# Patient Record
Sex: FEMALE | Race: BLACK OR AFRICAN AMERICAN | NOT HISPANIC OR LATINO
[De-identification: names, ages, dates, MRNs, and addresses within clinical notes are randomized per-mention and may not be internally consistent; named-entity substitution may affect disease eponyms.]

---

## 2019-11-27 PROBLEM — Z00.00 ENCOUNTER FOR PREVENTIVE HEALTH EXAMINATION: Status: ACTIVE | Noted: 2019-11-27

## 2019-12-02 ENCOUNTER — APPOINTMENT (OUTPATIENT)
Dept: BREAST CENTER | Facility: CLINIC | Age: 59
End: 2019-12-02

## 2019-12-09 ENCOUNTER — APPOINTMENT (OUTPATIENT)
Dept: BREAST CENTER | Facility: CLINIC | Age: 59
End: 2019-12-09

## 2020-01-06 ENCOUNTER — APPOINTMENT (OUTPATIENT)
Dept: BREAST CENTER | Facility: CLINIC | Age: 60
End: 2020-01-06

## 2020-01-15 ENCOUNTER — APPOINTMENT (OUTPATIENT)
Dept: BREAST CENTER | Facility: CLINIC | Age: 60
End: 2020-01-15
Payer: COMMERCIAL

## 2020-01-15 VITALS
TEMPERATURE: 98 F | HEIGHT: 62 IN | OXYGEN SATURATION: 98 % | HEART RATE: 78 BPM | DIASTOLIC BLOOD PRESSURE: 70 MMHG | WEIGHT: 175 LBS | BODY MASS INDEX: 32.2 KG/M2 | SYSTOLIC BLOOD PRESSURE: 118 MMHG

## 2020-01-15 DIAGNOSIS — R59.0 LOCALIZED ENLARGED LYMPH NODES: ICD-10-CM

## 2020-01-15 DIAGNOSIS — Z78.9 OTHER SPECIFIED HEALTH STATUS: ICD-10-CM

## 2020-01-15 DIAGNOSIS — R92.8 OTHER ABNORMAL AND INCONCLUSIVE FINDINGS ON DIAGNOSTIC IMAGING OF BREAST: ICD-10-CM

## 2020-01-15 PROCEDURE — 99203 OFFICE O/P NEW LOW 30 MIN: CPT

## 2020-01-15 RX ORDER — LORATADINE 10 MG/1
10 TABLET ORAL
Refills: 0 | Status: ACTIVE | COMMUNITY

## 2020-01-15 RX ORDER — EPINEPHRINE 0.3 MG/.3ML
0.3 INJECTION INTRAMUSCULAR
Refills: 0 | Status: ACTIVE | COMMUNITY

## 2020-01-15 RX ORDER — BACLOFEN 10 MG/1
10 TABLET ORAL
Refills: 0 | Status: ACTIVE | COMMUNITY

## 2020-01-15 RX ORDER — AMLODIPINE BESYLATE 5 MG/1
5 TABLET ORAL
Refills: 0 | Status: ACTIVE | COMMUNITY

## 2020-01-15 RX ORDER — IBUPROFEN 400 MG/1
400 TABLET, FILM COATED ORAL
Refills: 0 | Status: ACTIVE | COMMUNITY

## 2020-01-15 RX ORDER — FLUTICASONE PROPIONATE 50 MCG
SPRAY, SUSPENSION NASAL
Refills: 0 | Status: ACTIVE | COMMUNITY

## 2020-01-15 RX ORDER — TOLNAFTATE 1 G/100G
1 CREAM TOPICAL
Refills: 0 | Status: ACTIVE | COMMUNITY

## 2020-01-15 RX ORDER — RANITIDINE HCL 150 MG
150 CAPSULE ORAL
Refills: 0 | Status: ACTIVE | COMMUNITY

## 2020-01-15 RX ORDER — LANOLIN ALCOHOL/MO/W.PET/CERES
CREAM (GRAM) TOPICAL
Refills: 0 | Status: ACTIVE | COMMUNITY

## 2020-01-15 RX ORDER — CHLORPROMAZINE HYDROCHLORIDE 10 MG/1
10 TABLET, SUGAR COATED ORAL
Refills: 0 | Status: ACTIVE | COMMUNITY

## 2020-01-15 RX ORDER — AMMONIUM LACTATE 12 %
12 CREAM (GRAM) TOPICAL
Refills: 0 | Status: ACTIVE | COMMUNITY

## 2020-01-15 NOTE — PHYSICAL EXAM
[Normocephalic] : normocephalic [Atraumatic] : atraumatic [Supple] : supple [No Supraclavicular Adenopathy] : no supraclavicular adenopathy [Examined in the supine and seated position] : examined in the supine and seated position [No dominant masses] : no dominant masses in right breast  [No dominant masses] : no dominant masses left breast [No Nipple Retraction] : no left nipple retraction [No Axillary Lymphadenopathy] : no right axillary lymphadenopathy [No Nipple Discharge] : no left nipple discharge [No Rashes] : no rashes [No Edema] : no edema [No Ulceration] : no ulceration

## 2020-01-15 NOTE — HISTORY OF PRESENT ILLNESS
[FreeTextEntry1] : 60 year old post-menopausal AA female who is accompanied by her caregiver, Martina, was referred by Dr. Ray Whipple (PCP) presents for consultation regarding highly suspicious abnormal findings on mammogram completed on 11/12/19 showing left outer breast spiculated mass with abnormal appearing left axillary lymph nodes. Additional mammogram views and breast US with biopsy was recommended; however, patient has not undergone any additional follow up. NUNU of 15.1 % \par \par Discussed the next steps, which is biopsy of the left breast mass and left axillary lymph nodes. Discussed the likelihood of this lesion being cancer with the patient and the caregiver. They understood. As per caregiver, the patient's father makes medical decisions for the patient therefore will discuss the results of the biopsy with the father.\par \par To note the patient has CP and has a live-in caregiver.

## 2020-01-15 NOTE — DATA REVIEWED
[FreeTextEntry1] : --11/12/19 ([facility]) b/l screening mammo-cony showing a spiculated mass left outer breast with fine calcifications along with abnormal appearing lymph nodes in left axilla, highly suspicious for neoplastic process, additional diagnostic mammogram views, US recommended and if visualized on US, recommended for US guided biopsy of the lesion in left breast and left axillary lymph nodes recommended on day of diagnostic workup. Surgical consultation recommended. BIRADS 5 \par

## 2020-01-15 NOTE — PAST MEDICAL HISTORY
[Postmenopausal] : The patient is postmenopausal [unknown] : the patient is unsure of the date of her LMP [Total Preg ___] : G[unfilled] [Live Births ___] : P[unfilled]  [History of Hormone Replacement Treatment] : has no history of hormone replacement treatment [de-identified] : Does not remember first period [FreeTextEntry5] : None [FreeTextEntry6] : None [FreeTextEntry7] : None [FreeTextEntry8] : None

## 2020-02-04 ENCOUNTER — OUTPATIENT (OUTPATIENT)
Dept: OUTPATIENT SERVICES | Facility: HOSPITAL | Age: 60
LOS: 1 days | End: 2020-02-04
Payer: MEDICARE

## 2020-02-04 ENCOUNTER — APPOINTMENT (OUTPATIENT)
Dept: MAMMOGRAPHY | Facility: HOSPITAL | Age: 60
End: 2020-02-04
Payer: COMMERCIAL

## 2020-02-04 ENCOUNTER — RESULT REVIEW (OUTPATIENT)
Age: 60
End: 2020-02-04

## 2020-02-04 ENCOUNTER — APPOINTMENT (OUTPATIENT)
Dept: ULTRASOUND IMAGING | Facility: HOSPITAL | Age: 60
End: 2020-02-04
Payer: COMMERCIAL

## 2020-02-04 PROCEDURE — 88305 TISSUE EXAM BY PATHOLOGIST: CPT | Mod: 26

## 2020-02-04 PROCEDURE — 19084 BX BREAST ADD LESION US IMAG: CPT | Mod: 59,LT

## 2020-02-04 PROCEDURE — 76641 ULTRASOUND BREAST COMPLETE: CPT | Mod: 26,50

## 2020-02-04 PROCEDURE — 19084 BX BREAST ADD LESION US IMAG: CPT | Mod: LT

## 2020-02-04 PROCEDURE — 88360 TUMOR IMMUNOHISTOCHEM/MANUAL: CPT | Mod: 26

## 2020-02-04 PROCEDURE — 19083 BX BREAST 1ST LESION US IMAG: CPT | Mod: LT

## 2020-02-04 PROCEDURE — 38505 NEEDLE BIOPSY LYMPH NODES: CPT | Mod: LT

## 2020-02-04 PROCEDURE — 38505 NEEDLE BIOPSY LYMPH NODES: CPT

## 2020-02-04 PROCEDURE — G0279: CPT

## 2020-02-04 PROCEDURE — 88305 TISSUE EXAM BY PATHOLOGIST: CPT

## 2020-02-04 PROCEDURE — 19084 BX BREAST ADD LESION US IMAG: CPT

## 2020-02-04 PROCEDURE — A4648: CPT

## 2020-02-04 PROCEDURE — 77065 DX MAMMO INCL CAD UNI: CPT | Mod: 26,76,LT

## 2020-02-04 PROCEDURE — 19083 BX BREAST 1ST LESION US IMAG: CPT

## 2020-02-04 PROCEDURE — G0279: CPT | Mod: 26

## 2020-02-04 PROCEDURE — 76942 ECHO GUIDE FOR BIOPSY: CPT | Mod: 26

## 2020-02-04 PROCEDURE — 76942 ECHO GUIDE FOR BIOPSY: CPT

## 2020-02-04 PROCEDURE — 88360 TUMOR IMMUNOHISTOCHEM/MANUAL: CPT

## 2020-02-04 PROCEDURE — 76641 ULTRASOUND BREAST COMPLETE: CPT

## 2020-02-04 PROCEDURE — 77065 DX MAMMO INCL CAD UNI: CPT

## 2020-02-12 ENCOUNTER — APPOINTMENT (OUTPATIENT)
Dept: BREAST CENTER | Facility: CLINIC | Age: 60
End: 2020-02-12
Payer: COMMERCIAL

## 2020-02-12 VITALS
HEART RATE: 98 BPM | SYSTOLIC BLOOD PRESSURE: 116 MMHG | OXYGEN SATURATION: 96 % | HEIGHT: 62 IN | TEMPERATURE: 98.9 F | WEIGHT: 175 LBS | DIASTOLIC BLOOD PRESSURE: 78 MMHG | BODY MASS INDEX: 32.2 KG/M2

## 2020-02-12 PROCEDURE — 99213 OFFICE O/P EST LOW 20 MIN: CPT

## 2020-02-18 NOTE — PAST MEDICAL HISTORY
Duration Of Freeze Thaw-Cycle (Seconds): 5 Consent: The patient's consent was obtained including but not limited to risks of crusting, scabbing, blistering, scarring, darker or lighter pigmentary change, recurrence, incomplete removal and infection. [Postmenopausal] : The patient is postmenopausal Number Of Freeze-Thaw Cycles: 1 freeze-thaw cycle [unknown] : the patient is unsure of the date of her LMP Detail Level: Detailed [Total Preg ___] : G[unfilled] Render Post-Care Instructions In Note?: no [Live Births ___] : P[unfilled]  Post-Care Instructions: I reviewed with the patient in detail post-care instructions. Patient is to wear sunprotection, and avoid picking at any of the treated lesions. Pt may apply Vaseline to crusted or scabbing areas. [de-identified] : Does not remember first period [History of Hormone Replacement Treatment] : has no history of hormone replacement treatment Medical Necessity Clause: This procedure was medically necessary because the lesions that were treated were: [FreeTextEntry5] : None Medical Necessity Information: It is in your best interest to select a reason for this procedure from the list below. All of these items fulfill various CMS LCD requirements except the new and changing color options. [FreeTextEntry7] : None [FreeTextEntry6] : None [FreeTextEntry8] : None

## 2020-02-18 NOTE — ASSESSMENT
[FreeTextEntry1] : Emily is a 61 yo female with newly diagnosed invasive ductal carcinoma; she was accompanied today by her formal caregiver as well as her father. Education was provided regarding her diagnosis and treatment plan. She already met with Dr. Jaramillo (med onc) and the plan is for her to proceed with a PET/CT f/b neoadjuvant chemotherapy. She will RTC in 2 months to discuss further surgical management with Dr. Ya. All questions were answered; Emily, her father, and her caregiver verbalized understanding of the information.

## 2020-02-18 NOTE — REASON FOR VISIT
[Follow-Up: _____] : a [unfilled] follow-up visit [Parent] : parent [Formal Caregiver] : formal caregiver [FreeTextEntry1] : newly diagnosed left breast IDC w/ axillary mets

## 2020-02-18 NOTE — HISTORY OF PRESENT ILLNESS
[FreeTextEntry1] : Emily is a 61 yo female, who presents for follow-up after recent left breast biopsy revealing IDC, DCIS (ER+ [?95% 3+] GA negative, HER2 Equivocal, FISH negative] and left axillary biopsy revealed metastatic adenocarcinoma; KI67 is > 70%. She is here today with her father and her caregiver. \par \par The patient met with Dr. Jaramillo for consultation, the plan is for her to complete a PET/CT now with neoadjuvant chemotherapy.

## 2020-02-18 NOTE — DATA REVIEWED
[FreeTextEntry1] : --11/12/19 ([facility]) b/l screening mammo-cony showing a spiculated mass left outer breast with fine calcifications along with abnormal appearing lymph nodes in left axilla, highly suspicious for neoplastic process, additional diagnostic mammogram views, US recommended and if visualized on US, recommended for US guided biopsy of the lesion in left breast and left axillary lymph nodes recommended on day of diagnostic workup. Surgical consultation recommended. BIRADS 5 \par \par --2/4/2020 ([facility]) biopsy path: left 2:00-3:00n1-3 , 3.2 cm mass US bx, IDC, poorly differentiated, DCIS, high nuclear grade. Left 2:00-3:00n1-3, 1.9 cm mass, US bx, IDC poorly differentiated, spanning 1.7cm, DCIS high nuclear grade. Left axilla, US bx, metastatic adenocarcinoma. KI-67 is >70%; ER+ LA negative, HER2 equivocal, FISH pending.

## 2020-02-26 ENCOUNTER — OUTPATIENT (OUTPATIENT)
Dept: OUTPATIENT SERVICES | Facility: HOSPITAL | Age: 60
LOS: 1 days | End: 2020-02-26
Payer: MEDICARE

## 2020-02-26 LAB — GLUCOSE BLDC GLUCOMTR-MCNC: 99 MG/DL — SIGNIFICANT CHANGE UP (ref 70–99)

## 2020-02-26 PROCEDURE — 82962 GLUCOSE BLOOD TEST: CPT

## 2020-02-26 PROCEDURE — 78815 PET IMAGE W/CT SKULL-THIGH: CPT

## 2020-02-26 PROCEDURE — 78815 PET IMAGE W/CT SKULL-THIGH: CPT | Mod: 26

## 2020-02-26 PROCEDURE — A9552: CPT

## 2020-03-06 ENCOUNTER — OUTPATIENT (OUTPATIENT)
Dept: OUTPATIENT SERVICES | Facility: HOSPITAL | Age: 60
LOS: 1 days | End: 2020-03-06
Payer: MEDICARE

## 2020-03-06 ENCOUNTER — RESULT REVIEW (OUTPATIENT)
Age: 60
End: 2020-03-06

## 2020-03-06 ENCOUNTER — APPOINTMENT (OUTPATIENT)
Dept: CT IMAGING | Facility: HOSPITAL | Age: 60
End: 2020-03-06
Payer: COMMERCIAL

## 2020-03-06 ENCOUNTER — APPOINTMENT (OUTPATIENT)
Dept: INTERVENTIONAL RADIOLOGY/VASCULAR | Facility: HOSPITAL | Age: 60
End: 2020-03-06

## 2020-03-06 PROCEDURE — 20225 BONE BIOPSY TROCAR/NDL DEEP: CPT

## 2020-03-06 PROCEDURE — 77012 CT SCAN FOR NEEDLE BIOPSY: CPT | Mod: 26

## 2020-03-06 PROCEDURE — 88173 CYTOPATH EVAL FNA REPORT: CPT | Mod: 26

## 2020-03-06 PROCEDURE — 88305 TISSUE EXAM BY PATHOLOGIST: CPT | Mod: 26

## 2020-03-06 PROCEDURE — 88311 DECALCIFY TISSUE: CPT | Mod: 26

## 2020-03-06 PROCEDURE — 88305 TISSUE EXAM BY PATHOLOGIST: CPT

## 2020-03-06 PROCEDURE — 77012 CT SCAN FOR NEEDLE BIOPSY: CPT

## 2020-03-06 PROCEDURE — C1769: CPT

## 2020-03-06 PROCEDURE — 88173 CYTOPATH EVAL FNA REPORT: CPT

## 2020-03-06 PROCEDURE — 88311 DECALCIFY TISSUE: CPT

## 2020-03-09 LAB
NON-GYNECOLOGICAL CYTOLOGY STUDY: SIGNIFICANT CHANGE UP
SURGICAL PATHOLOGY STUDY: SIGNIFICANT CHANGE UP

## 2020-03-20 ENCOUNTER — OUTPATIENT (OUTPATIENT)
Dept: OUTPATIENT SERVICES | Facility: HOSPITAL | Age: 60
LOS: 1 days | End: 2020-03-20
Payer: MEDICAID

## 2020-03-20 ENCOUNTER — APPOINTMENT (OUTPATIENT)
Dept: INTERVENTIONAL RADIOLOGY/VASCULAR | Facility: HOSPITAL | Age: 60
End: 2020-03-20
Payer: MEDICARE

## 2020-03-20 PROCEDURE — 99152 MOD SED SAME PHYS/QHP 5/>YRS: CPT

## 2020-03-20 PROCEDURE — 76937 US GUIDE VASCULAR ACCESS: CPT

## 2020-03-20 PROCEDURE — C1788: CPT

## 2020-03-20 PROCEDURE — 77001 FLUOROGUIDE FOR VEIN DEVICE: CPT | Mod: 26

## 2020-03-20 PROCEDURE — 77001 FLUOROGUIDE FOR VEIN DEVICE: CPT

## 2020-03-20 PROCEDURE — 36561 INSERT TUNNELED CV CATH: CPT

## 2020-03-20 PROCEDURE — 76937 US GUIDE VASCULAR ACCESS: CPT | Mod: 26

## 2020-03-20 PROCEDURE — C1769: CPT

## 2020-03-23 ENCOUNTER — OUTPATIENT (OUTPATIENT)
Dept: OUTPATIENT SERVICES | Facility: HOSPITAL | Age: 60
LOS: 1 days | End: 2020-03-23
Payer: MEDICAID

## 2020-03-23 PROCEDURE — 78472 GATED HEART PLANAR SINGLE: CPT | Mod: 26

## 2020-03-23 PROCEDURE — 78472 GATED HEART PLANAR SINGLE: CPT

## 2020-03-23 PROCEDURE — A9560: CPT

## 2020-08-24 ENCOUNTER — APPOINTMENT (OUTPATIENT)
Dept: BREAST CENTER | Facility: CLINIC | Age: 60
End: 2020-08-24
Payer: MEDICARE

## 2020-08-24 VITALS — OXYGEN SATURATION: 96 % | HEART RATE: 82 BPM | BODY MASS INDEX: 32.2 KG/M2 | WEIGHT: 175 LBS | HEIGHT: 62 IN

## 2020-08-24 PROCEDURE — 99213 OFFICE O/P EST LOW 20 MIN: CPT

## 2020-08-28 ENCOUNTER — OUTPATIENT (OUTPATIENT)
Dept: OUTPATIENT SERVICES | Facility: HOSPITAL | Age: 60
LOS: 1 days | End: 2020-08-28
Payer: MEDICAID

## 2020-08-28 LAB — GLUCOSE BLDC GLUCOMTR-MCNC: 135 MG/DL — HIGH (ref 70–99)

## 2020-08-28 PROCEDURE — 78815 PET IMAGE W/CT SKULL-THIGH: CPT | Mod: 26

## 2020-08-28 PROCEDURE — A9552: CPT

## 2020-08-28 PROCEDURE — 82962 GLUCOSE BLOOD TEST: CPT

## 2020-08-28 PROCEDURE — 78815 PET IMAGE W/CT SKULL-THIGH: CPT

## 2020-08-31 ENCOUNTER — APPOINTMENT (OUTPATIENT)
Dept: ULTRASOUND IMAGING | Facility: HOSPITAL | Age: 60
End: 2020-08-31

## 2020-08-31 ENCOUNTER — APPOINTMENT (OUTPATIENT)
Dept: MAMMOGRAPHY | Facility: HOSPITAL | Age: 60
End: 2020-08-31

## 2020-08-31 NOTE — PHYSICAL EXAM
[Normocephalic] : normocephalic [Atraumatic] : atraumatic [Supple] : supple [Examined in the supine and seated position] : examined in the supine and seated position [No Supraclavicular Adenopathy] : no supraclavicular adenopathy [No dominant masses] : no dominant masses in right breast  [No Nipple Retraction] : no left nipple retraction [No Nipple Discharge] : no left nipple discharge [No Axillary Lymphadenopathy] : no left axillary lymphadenopathy [No Edema] : no edema [No Rashes] : no rashes [No Ulceration] : no ulceration

## 2020-08-31 NOTE — DATA REVIEWED
[FreeTextEntry1] : --11/12/19 ([facility]) b/l screening mammo-cony showing a spiculated mass left outer breast with fine calcifications along with abnormal appearing lymph nodes in left axilla, highly suspicious for neoplastic process, additional diagnostic mammogram views, US recommended and if visualized on US, recommended for US guided biopsy of the lesion in left breast and left axillary lymph nodes recommended on day of diagnostic workup. Surgical consultation recommended. BIRADS 5 \par  \par --2/4/2020 ([facility]) biopsy path: left 2:00-3:00n1-3 , 3.2 cm mass US bx, IDC, poorly differentiated, DCIS, high nuclear grade. Left 2:00-3:00n1-3, 1.9 cm mass, US bx, IDC poorly differentiated, spanning 1.7cm, DCIS high nuclear grade. Left axilla, US bx, metastatic adenocarcinoma. KI-67 is >70%; ER+ IA negative, HER2 equivocal, FISH negative. \par \par -- 2/26/2020 ([facility]) PET/CT: FDG uptake within left breast masses c/w known carcinoma as well as in axillary LNs c/w known carcinoma. Increased uptake in the left proximal femur, ely mets cannot be excluded. Consider tissue sampling for further evaluation. \par \par -- 3/6/2020 ([facility]) left femur bone biopsy: benign bone, no carcinoma visualized.

## 2020-08-31 NOTE — PAST MEDICAL HISTORY
[Postmenopausal] : The patient is postmenopausal [unknown] : the patient is unsure of the date of her LMP [Total Preg ___] : G[unfilled] [Live Births ___] : P[unfilled]  [History of Hormone Replacement Treatment] : has no history of hormone replacement treatment [de-identified] : Does not remember first period [FreeTextEntry5] : None [FreeTextEntry6] : None [FreeTextEntry7] : None [FreeTextEntry8] : None

## 2020-09-14 ENCOUNTER — APPOINTMENT (OUTPATIENT)
Dept: MRI IMAGING | Facility: CLINIC | Age: 60
End: 2020-09-14
Payer: MEDICARE

## 2020-09-14 ENCOUNTER — OUTPATIENT (OUTPATIENT)
Dept: OUTPATIENT SERVICES | Facility: HOSPITAL | Age: 60
LOS: 1 days | End: 2020-09-14

## 2020-09-14 PROCEDURE — 72158 MRI LUMBAR SPINE W/O & W/DYE: CPT | Mod: 26

## 2020-09-16 ENCOUNTER — APPOINTMENT (OUTPATIENT)
Dept: MRI IMAGING | Facility: CLINIC | Age: 60
End: 2020-09-16

## 2020-09-30 ENCOUNTER — APPOINTMENT (OUTPATIENT)
Dept: CT IMAGING | Facility: HOSPITAL | Age: 60
End: 2020-09-30
Payer: COMMERCIAL

## 2020-09-30 ENCOUNTER — OUTPATIENT (OUTPATIENT)
Dept: OUTPATIENT SERVICES | Facility: HOSPITAL | Age: 60
LOS: 1 days | End: 2020-09-30
Payer: MEDICAID

## 2020-09-30 ENCOUNTER — RESULT REVIEW (OUTPATIENT)
Age: 60
End: 2020-09-30

## 2020-09-30 ENCOUNTER — APPOINTMENT (OUTPATIENT)
Dept: INTERVENTIONAL RADIOLOGY/VASCULAR | Facility: HOSPITAL | Age: 60
End: 2020-09-30
Payer: COMMERCIAL

## 2020-09-30 PROCEDURE — 88305 TISSUE EXAM BY PATHOLOGIST: CPT

## 2020-09-30 PROCEDURE — 88311 DECALCIFY TISSUE: CPT | Mod: 26

## 2020-09-30 PROCEDURE — 88311 DECALCIFY TISSUE: CPT

## 2020-09-30 PROCEDURE — 88341 IMHCHEM/IMCYTCHM EA ADD ANTB: CPT

## 2020-09-30 PROCEDURE — 77012 CT SCAN FOR NEEDLE BIOPSY: CPT | Mod: 26

## 2020-09-30 PROCEDURE — 88342 IMHCHEM/IMCYTCHM 1ST ANTB: CPT | Mod: 26

## 2020-09-30 PROCEDURE — 20220 BONE BIOPSY TROCAR/NDL SUPFC: CPT

## 2020-09-30 PROCEDURE — 88305 TISSUE EXAM BY PATHOLOGIST: CPT | Mod: 26

## 2020-09-30 PROCEDURE — 88173 CYTOPATH EVAL FNA REPORT: CPT

## 2020-09-30 PROCEDURE — 88341 IMHCHEM/IMCYTCHM EA ADD ANTB: CPT | Mod: 26

## 2020-09-30 PROCEDURE — 88173 CYTOPATH EVAL FNA REPORT: CPT | Mod: 26

## 2020-09-30 PROCEDURE — 77012 CT SCAN FOR NEEDLE BIOPSY: CPT

## 2020-10-02 LAB — NON-GYNECOLOGICAL CYTOLOGY STUDY: SIGNIFICANT CHANGE UP

## 2020-10-05 LAB — SURGICAL PATHOLOGY STUDY: SIGNIFICANT CHANGE UP

## 2020-10-13 ENCOUNTER — APPOINTMENT (OUTPATIENT)
Dept: ULTRASOUND IMAGING | Facility: HOSPITAL | Age: 60
End: 2020-10-13

## 2020-10-13 ENCOUNTER — APPOINTMENT (OUTPATIENT)
Dept: MAMMOGRAPHY | Facility: HOSPITAL | Age: 60
End: 2020-10-13

## 2020-10-16 ENCOUNTER — APPOINTMENT (OUTPATIENT)
Dept: BREAST CENTER | Facility: CLINIC | Age: 60
End: 2020-10-16

## 2020-11-04 ENCOUNTER — RESULT REVIEW (OUTPATIENT)
Age: 60
End: 2020-11-04

## 2020-11-04 ENCOUNTER — OUTPATIENT (OUTPATIENT)
Dept: OUTPATIENT SERVICES | Facility: HOSPITAL | Age: 60
LOS: 1 days | End: 2020-11-04
Payer: MEDICAID

## 2020-11-04 ENCOUNTER — APPOINTMENT (OUTPATIENT)
Dept: ULTRASOUND IMAGING | Facility: HOSPITAL | Age: 60
End: 2020-11-04

## 2020-11-04 ENCOUNTER — APPOINTMENT (OUTPATIENT)
Dept: MAMMOGRAPHY | Facility: HOSPITAL | Age: 60
End: 2020-11-04

## 2020-11-04 DIAGNOSIS — Z01.818 ENCOUNTER FOR OTHER PREPROCEDURAL EXAMINATION: ICD-10-CM

## 2020-11-04 PROCEDURE — 77065 DX MAMMO INCL CAD UNI: CPT

## 2020-11-04 PROCEDURE — 76641 ULTRASOUND BREAST COMPLETE: CPT

## 2020-11-04 PROCEDURE — 19285 PERQ DEV BREAST 1ST US IMAG: CPT | Mod: LT

## 2020-11-04 PROCEDURE — G0279: CPT

## 2020-11-04 PROCEDURE — 19286 PERQ DEV BREAST ADD US IMAG: CPT | Mod: LT

## 2020-11-04 PROCEDURE — C1739: CPT

## 2020-11-04 PROCEDURE — 19285 PERQ DEV BREAST 1ST US IMAG: CPT

## 2020-11-04 PROCEDURE — 19281 PERQ DEVICE BREAST 1ST IMAG: CPT | Mod: 59,LT

## 2020-11-04 PROCEDURE — G0279: CPT | Mod: 26

## 2020-11-04 PROCEDURE — 19286 PERQ DEV BREAST ADD US IMAG: CPT

## 2020-11-04 PROCEDURE — 77065 DX MAMMO INCL CAD UNI: CPT | Mod: 26,59,LT

## 2020-11-04 PROCEDURE — 76641 ULTRASOUND BREAST COMPLETE: CPT | Mod: 26,LT

## 2020-11-04 PROCEDURE — 19281 PERQ DEVICE BREAST 1ST IMAG: CPT

## 2020-11-05 ENCOUNTER — OUTPATIENT (OUTPATIENT)
Dept: OUTPATIENT SERVICES | Facility: HOSPITAL | Age: 60
LOS: 1 days | End: 2020-11-05
Payer: MEDICAID

## 2020-11-05 ENCOUNTER — TRANSCRIPTION ENCOUNTER (OUTPATIENT)
Age: 60
End: 2020-11-05

## 2020-11-05 ENCOUNTER — RESULT REVIEW (OUTPATIENT)
Age: 60
End: 2020-11-05

## 2020-11-05 PROCEDURE — 78195 LYMPH SYSTEM IMAGING: CPT | Mod: 26

## 2020-11-05 NOTE — ASU PATIENT PROFILE, ADULT - PMH
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

## 2020-11-05 NOTE — ASU PATIENT PROFILE, ADULT - VISION (WITH CORRECTIVE LENSES IF THE PATIENT USUALLY WEARS THEM):
Severely impaired: cannot locate objects without hearing or touching them or patient nonresponsive. Partially impaired: cannot see medication labels or newsprint, but can see obstacles in path, and the surrounding layout; can count fingers at arm's length

## 2020-11-06 ENCOUNTER — OUTPATIENT (OUTPATIENT)
Dept: OUTPATIENT SERVICES | Facility: HOSPITAL | Age: 60
LOS: 1 days | Discharge: HOME | End: 2020-11-06
Payer: MEDICAID

## 2020-11-06 ENCOUNTER — APPOINTMENT (OUTPATIENT)
Dept: BREAST CENTER | Facility: CLINIC | Age: 60
End: 2020-11-06

## 2020-11-06 ENCOUNTER — RESULT REVIEW (OUTPATIENT)
Age: 60
End: 2020-11-06

## 2020-11-06 VITALS
RESPIRATION RATE: 16 BRPM | HEART RATE: 89 BPM | DIASTOLIC BLOOD PRESSURE: 104 MMHG | HEIGHT: 62 IN | OXYGEN SATURATION: 99 % | WEIGHT: 156.09 LBS | TEMPERATURE: 97 F | SYSTOLIC BLOOD PRESSURE: 161 MMHG

## 2020-11-06 PROCEDURE — 76098 X-RAY EXAM SURGICAL SPECIMEN: CPT | Mod: 26

## 2020-11-06 PROCEDURE — 88307 TISSUE EXAM BY PATHOLOGIST: CPT | Mod: 26

## 2020-11-06 PROCEDURE — 88309 TISSUE EXAM BY PATHOLOGIST: CPT | Mod: 26

## 2020-11-06 PROCEDURE — 88342 IMHCHEM/IMCYTCHM 1ST ANTB: CPT | Mod: 26

## 2020-11-06 PROCEDURE — 88332 PATH CONSLTJ SURG EA ADD BLK: CPT | Mod: 26

## 2020-11-06 PROCEDURE — 88305 TISSUE EXAM BY PATHOLOGIST: CPT | Mod: 26

## 2020-11-06 PROCEDURE — A9541: CPT

## 2020-11-06 PROCEDURE — 88341 IMHCHEM/IMCYTCHM EA ADD ANTB: CPT | Mod: 26

## 2020-11-06 PROCEDURE — 19307 MAST MOD RAD: CPT | Mod: LT

## 2020-11-06 PROCEDURE — 88331 PATH CONSLTJ SURG 1 BLK 1SPC: CPT | Mod: 26

## 2020-11-06 PROCEDURE — 78195 LYMPH SYSTEM IMAGING: CPT

## 2020-11-06 RX ORDER — BUPIVACAINE 13.3 MG/ML
20 INJECTION, SUSPENSION, LIPOSOMAL INFILTRATION ONCE
Refills: 0 | Status: DISCONTINUED | OUTPATIENT
Start: 2020-11-06 | End: 2020-11-07

## 2020-11-06 RX ORDER — AMLODIPINE BESYLATE 2.5 MG/1
5 TABLET ORAL DAILY
Refills: 0 | Status: DISCONTINUED | OUTPATIENT
Start: 2020-11-06 | End: 2020-11-07

## 2020-11-06 RX ORDER — ACETAMINOPHEN 500 MG
1000 TABLET ORAL EVERY 6 HOURS
Refills: 0 | Status: DISCONTINUED | OUTPATIENT
Start: 2020-11-06 | End: 2020-11-07

## 2020-11-06 RX ORDER — KETOROLAC TROMETHAMINE 30 MG/ML
15 SYRINGE (ML) INJECTION EVERY 6 HOURS
Refills: 0 | Status: DISCONTINUED | OUTPATIENT
Start: 2020-11-06 | End: 2020-11-07

## 2020-11-06 RX ORDER — AMLODIPINE BESYLATE 2.5 MG/1
1 TABLET ORAL
Qty: 0 | Refills: 0 | DISCHARGE

## 2020-11-06 RX ORDER — DIPHENHYDRAMINE HCL 50 MG
0 CAPSULE ORAL
Qty: 0 | Refills: 0 | DISCHARGE

## 2020-11-06 RX ORDER — OXYCODONE HYDROCHLORIDE 5 MG/1
5 TABLET ORAL EVERY 6 HOURS
Refills: 0 | Status: DISCONTINUED | OUTPATIENT
Start: 2020-11-06 | End: 2020-11-07

## 2020-11-06 RX ORDER — SODIUM CHLORIDE 9 MG/ML
1000 INJECTION, SOLUTION INTRAVENOUS
Refills: 0 | Status: DISCONTINUED | OUTPATIENT
Start: 2020-11-06 | End: 2020-11-07

## 2020-11-06 RX ORDER — CHLORPROMAZINE HCL 10 MG
0 TABLET ORAL
Qty: 0 | Refills: 0 | DISCHARGE

## 2020-11-06 RX ORDER — MICONAZOLE NITRATE 2 %
0 CREAM (GRAM) TOPICAL
Qty: 0 | Refills: 0 | DISCHARGE

## 2020-11-06 RX ORDER — ONDANSETRON 8 MG/1
4 TABLET, FILM COATED ORAL ONCE
Refills: 0 | Status: DISCONTINUED | OUTPATIENT
Start: 2020-11-06 | End: 2020-11-07

## 2020-11-06 RX ORDER — FLUTICASONE PROPIONATE 50 MCG
0 SPRAY, SUSPENSION NASAL
Qty: 0 | Refills: 0 | DISCHARGE

## 2020-11-06 RX ORDER — OXYCODONE HYDROCHLORIDE 5 MG/1
10 TABLET ORAL EVERY 6 HOURS
Refills: 0 | Status: DISCONTINUED | OUTPATIENT
Start: 2020-11-06 | End: 2020-11-07

## 2020-11-06 RX ADMIN — Medication 1000 MILLIGRAM(S): at 22:57

## 2020-11-06 RX ADMIN — Medication 15 MILLIGRAM(S): at 19:18

## 2020-11-06 RX ADMIN — Medication 1000 MILLIGRAM(S): at 23:14

## 2020-11-06 NOTE — BRIEF OPERATIVE NOTE - OPERATION/FINDINGS
LEFT SLNB performed through axillary incision. Two nodes localized w/ Ruthie  clips retrieved. Tc99 tracer did not map. Multiple blue lymphatics noted however tagged nodes not blue. Frozen section: SLN replaced w/ carcinoma. Palpable LNs in level 1 & 2 removed.     Flaps raised superiorly to clavicle, medially to sternum, inferiorly to IMF, laterally to border of latissimus dorsi, and posteriorly to pec major fascia using electrocautery. PHUONG drains placed in breast & axilla. Skin closed using 3-0 Vicryl & 5-0 Monocryl. Exparel injected.

## 2020-11-06 NOTE — PROGRESS NOTE ADULT - SUBJECTIVE AND OBJECTIVE BOX
POST-OPERATIVE NOTE    Procedure: Modified left radical mastectomy with SNLB and SLND.     Diagnosis/Indication: Left breast invasive ductal carcinoma    Surgeon: Melody Ya    S: Seen and evaluated at bedside. Resting comfortably in bed. Denies any pain in her chest or weakness in her upper extremities. Tolerating apple juice without nausea or emesis. Has not passed flatus or had BM since her procedure. Denies CP, SOB, JEREZ, calf tenderness. Pain controlled with medication.    O:  T(C): 36.9 (11-06-20 @ 21:46), Max: 36.9 (11-06-20 @ 21:46)  T(F): 98.5 (11-06-20 @ 21:46), Max: 98.5 (11-06-20 @ 21:46)  HR: 68 (11-06-20 @ 21:46) (68 - 79)  BP: 135/83 (11-06-20 @ 21:46) (105/69 - 135/83)  RR: 18 (11-06-20 @ 21:46) (18 - 19)  SpO2: 99% (11-06-20 @ 21:46) (99% - 99%)  Wt(kg): --    I&O's Summary    06 Nov 2020 07:01  -  06 Nov 2020 23:13  --------------------------------------------------------  IN: 200 mL / OUT: 400 mL / NET: -200 mL      Gen: NAD, resting comfortably in bed  C/V: NSR  Pulm: Nonlabored breathing, no respiratory distress  Chest: Left breast soft, without surrounding ecchymosis or hematoma formation. Left breast and left axillary drains in place to bulb suction  Abd: soft, NT/ND  Extrem: WWP, no calf edema or tenderness, SCDs in place  Incision: c/d/i with steri strips in place      A/P: 60y Female s/p above procedure    Diet: Regular   IVF: LR 100cc/hr  Analgesia with tylenol, toradol, and oxycodone  Anatiemetics PRN-zofran  SCDs/OOBA/IS- Holding chemical PPX  Will need PHUONG drain education

## 2020-11-07 VITALS
OXYGEN SATURATION: 99 % | SYSTOLIC BLOOD PRESSURE: 159 MMHG | DIASTOLIC BLOOD PRESSURE: 85 MMHG | HEART RATE: 93 BPM | TEMPERATURE: 98 F | RESPIRATION RATE: 18 BRPM

## 2020-11-07 PROCEDURE — 19301 PARTIAL MASTECTOMY: CPT | Mod: LT

## 2020-11-07 PROCEDURE — 88305 TISSUE EXAM BY PATHOLOGIST: CPT

## 2020-11-07 PROCEDURE — 88307 TISSUE EXAM BY PATHOLOGIST: CPT

## 2020-11-07 PROCEDURE — 88341 IMHCHEM/IMCYTCHM EA ADD ANTB: CPT

## 2020-11-07 PROCEDURE — 88331 PATH CONSLTJ SURG 1 BLK 1SPC: CPT

## 2020-11-07 PROCEDURE — 76098 X-RAY EXAM SURGICAL SPECIMEN: CPT

## 2020-11-07 PROCEDURE — 38525 BIOPSY/REMOVAL LYMPH NODES: CPT | Mod: LT

## 2020-11-07 PROCEDURE — 88332 PATH CONSLTJ SURG EA ADD BLK: CPT

## 2020-11-07 PROCEDURE — 88309 TISSUE EXAM BY PATHOLOGIST: CPT

## 2020-11-07 RX ADMIN — Medication 15 MILLIGRAM(S): at 11:27

## 2020-11-07 RX ADMIN — Medication 15 MILLIGRAM(S): at 06:30

## 2020-11-07 RX ADMIN — Medication 1000 MILLIGRAM(S): at 11:26

## 2020-11-07 RX ADMIN — Medication 15 MILLIGRAM(S): at 05:58

## 2020-11-07 RX ADMIN — Medication 1000 MILLIGRAM(S): at 06:25

## 2020-11-07 RX ADMIN — AMLODIPINE BESYLATE 5 MILLIGRAM(S): 2.5 TABLET ORAL at 06:00

## 2020-11-07 RX ADMIN — Medication 1000 MILLIGRAM(S): at 06:01

## 2020-11-07 RX ADMIN — Medication 1000 MILLIGRAM(S): at 12:20

## 2020-11-07 RX ADMIN — Medication 15 MILLIGRAM(S): at 12:20

## 2020-11-07 NOTE — PROGRESS NOTE ADULT - ASSESSMENT
60F w/ CP & L breast IDC w/ axillary mets s/p NAC now POD 1 s/p L MRM (11/6).     - 23hr observation   - discharge

## 2020-11-07 NOTE — PROGRESS NOTE ADULT - SUBJECTIVE AND OBJECTIVE BOX
POST-OP DAY: 1 s/p L MRM     SUBJECTIVE: Patient seen and examined bedside by chief resident. Denies pain, tolerating regular diet, denies N/V. Pain well managed. Clear for d/c.    amLODIPine   Tablet 5 milliGRAM(s) Oral daily    MEDICATIONS  (PRN):  ondansetron Injectable 4 milliGRAM(s) IV Push Once PRN Nausea  oxyCODONE    IR 5 milliGRAM(s) Oral every 6 hours PRN Moderate Pain (4 - 6)  oxyCODONE    IR 10 milliGRAM(s) Oral every 6 hours PRN Severe Pain (7 - 10)      I&O's Detail    06 Nov 2020 07:01  -  07 Nov 2020 07:00  --------------------------------------------------------  IN:    Lactated Ringers: 1100 mL  Total IN: 1100 mL    OUT:    Bulb (mL): 57 mL    Bulb (mL): 40 mL    Voided (mL): 350 mL  Total OUT: 447 mL    Total NET: 653 mL    Vital Signs Last 24 Hrs  T(C): 36.6 (07 Nov 2020 09:00), Max: 36.9 (06 Nov 2020 21:46)  T(F): 97.9 (07 Nov 2020 09:00), Max: 98.5 (06 Nov 2020 21:46)  HR: 82 (07 Nov 2020 09:00) (68 - 82)  BP: 154/79 (07 Nov 2020 09:00) (98/58 - 154/79)  BP(mean): 83 (06 Nov 2020 19:20) (74 - 83)  RR: 15 (07 Nov 2020 09:00) (12 - 19)  SpO2: 100% (07 Nov 2020 09:00) (99% - 100%)    General: NAD, resting comfortably in bed  C/V: NSR  Breast: Dressing clean, dry, intact, PHUONG x2 SS.   Pulm: Nonlabored breathing, no respiratory distress  Abd: soft, NT/ND  Extrem: WWP, no edema, SCDs in place    LABS:                RADIOLOGY & ADDITIONAL STUDIES:

## 2020-11-11 ENCOUNTER — APPOINTMENT (OUTPATIENT)
Dept: BREAST CENTER | Facility: CLINIC | Age: 60
End: 2020-11-11
Payer: MEDICARE

## 2020-11-11 VITALS — HEIGHT: 62 IN | OXYGEN SATURATION: 99 % | HEART RATE: 93 BPM | BODY MASS INDEX: 32.2 KG/M2 | WEIGHT: 175 LBS

## 2020-11-11 PROBLEM — Z86.79 PERSONAL HISTORY OF OTHER DISEASES OF THE CIRCULATORY SYSTEM: Chronic | Status: ACTIVE | Noted: 2020-11-05

## 2020-11-11 PROBLEM — G80.2 SPASTIC HEMIPLEGIC CEREBRAL PALSY: Chronic | Status: ACTIVE | Noted: 2020-11-05

## 2020-11-11 PROBLEM — R06.6 HICCOUGH: Chronic | Status: ACTIVE | Noted: 2020-11-05

## 2020-11-11 PROBLEM — C50.912 MALIGNANT NEOPLASM OF UNSPECIFIED SITE OF LEFT FEMALE BREAST: Chronic | Status: ACTIVE | Noted: 2020-11-05

## 2020-11-11 PROCEDURE — 99024 POSTOP FOLLOW-UP VISIT: CPT

## 2020-11-12 NOTE — DATA REVIEWED
[FreeTextEntry1] : --11/12/19 ([facility]) b/l screening mammo-cony showing a spiculated mass left outer breast with fine calcifications along with abnormal appearing lymph nodes in left axilla, highly suspicious for neoplastic process, additional diagnostic mammogram views, US recommended and if visualized on US, recommended for US guided biopsy of the lesion in left breast and left axillary lymph nodes recommended on day of diagnostic workup. Surgical consultation recommended. BI-RADS 5 \par  \par --2/4/2020 ([facility]) biopsy path: left 2:00-3:00n1-3 , 3.2 cm mass US bx, IDC, poorly differentiated, DCIS, high nuclear grade. Left 2:00-3:00n1-3, 1.9 cm mass, US bx, IDC poorly differentiated, spanning 1.7cm, DCIS high nuclear grade. Left axilla, US bx, metastatic adenocarcinoma. KI-67 is >70%; ER+ NC negative, HER2 equivocal, FISH negative. \par \par -- 2/26/2020 ([facility]) PET/CT: FDG uptake within left breast masses c/w known carcinoma as well as in axillary LNs c/w known carcinoma. Increased uptake in the left proximal femur, ely mets cannot be excluded. Consider tissue sampling for further evaluation. \par \par -- 3/6/2020 ([facility]) left femur bone biopsy: benign bone, no carcinoma visualized. \par \par --11/9/2020 ([facility]) left mastectomy, ALND surgical pathology pending

## 2020-11-12 NOTE — HISTORY OF PRESENT ILLNESS
[FreeTextEntry1] : Patient presents for post op for left breast IDC & DCIS (ER+ [95% 3+] [place] negative, HER2 Equivocal, FISH negative] and metastatic adenocarcinoma to the left axilla; KI67 is > 70%. S/p neoadjuvant chemotherapy. S/p left mastectomy with ALND on 11/6/2020. Final surgical pathology is pending. PHUONG drains in place with serosanguineous fluid, 50-60mL in past 24 hourst. She denies fever or chills. Reports some tenderness at surgical site, denies pain. \par \par Discussed with patient and aide (and her father on POD1) that final pathology is pending, however, frozen section revealed cancer in the clipped nodes. Discussed that the patient will need post mastectomy radiation for this reason and because of the aggressiveness of the tumor. \par \par \par

## 2020-11-12 NOTE — REASON FOR VISIT
[Post Op: _________] : a [unfilled] post op visit [FreeTextEntry1] : left breast IDC w/ axillary mets, s/p left mastectomy and left ALND

## 2020-11-12 NOTE — PAST MEDICAL HISTORY
[Postmenopausal] : The patient is postmenopausal [unknown] : the patient is unsure of the date of her LMP [Total Preg ___] : G[unfilled] [Live Births ___] : P[unfilled]  [History of Hormone Replacement Treatment] : has no history of hormone replacement treatment [de-identified] : Does not remember first period [FreeTextEntry5] : None [FreeTextEntry6] : None [FreeTextEntry7] : None [FreeTextEntry8] : None

## 2020-11-12 NOTE — PHYSICAL EXAM
[Normocephalic] : normocephalic [Atraumatic] : atraumatic [Supple] : supple [No Supraclavicular Adenopathy] : no supraclavicular adenopathy [No Cervical Adenopathy] : no cervical adenopathy [Examined in the supine and seated position] : examined in the supine and seated position [No dominant masses] : no dominant masses in right breast  [No dominant masses] : no dominant masses left breast [No Nipple Retraction] : no right nipple retraction [No Nipple Discharge] : no right nipple discharge [No Axillary Lymphadenopathy] : no left axillary lymphadenopathy [No Edema] : no edema [No Rashes] : no rashes [No Ulceration] : no ulceration

## 2020-11-16 ENCOUNTER — APPOINTMENT (OUTPATIENT)
Dept: BREAST CENTER | Facility: CLINIC | Age: 60
End: 2020-11-16
Payer: MEDICARE

## 2020-11-16 VITALS — WEIGHT: 175 LBS | HEIGHT: 62 IN | HEART RATE: 103 BPM | OXYGEN SATURATION: 98 % | BODY MASS INDEX: 32.2 KG/M2

## 2020-11-16 LAB — SURGICAL PATHOLOGY STUDY: SIGNIFICANT CHANGE UP

## 2020-11-16 PROCEDURE — 99024 POSTOP FOLLOW-UP VISIT: CPT

## 2020-11-16 NOTE — DATA REVIEWED
[FreeTextEntry1] : --11/12/19 ([facility]) b/l screening mammo-cony showing a spiculated mass left outer breast with fine calcifications along with abnormal appearing lymph nodes in left axilla, highly suspicious for neoplastic process, additional diagnostic mammogram views, US recommended and if visualized on US, recommended for US guided biopsy of the lesion in left breast and left axillary lymph nodes recommended on day of diagnostic workup. Surgical consultation recommended. BI-RADS 5 \par  \par --2/4/2020 ([facility]) biopsy path: left 2:00-3:00n1-3 , 3.2 cm mass US bx, IDC, poorly differentiated, DCIS, high nuclear grade. Left 2:00-3:00n1-3, 1.9 cm mass, US bx, IDC poorly differentiated, spanning 1.7cm, DCIS high nuclear grade. Left axilla, US bx, metastatic adenocarcinoma. KI-67 is >70%; ER+ IA negative, HER2 equivocal, FISH negative. \par \par -- 2/26/2020 ([facility]) PET/CT: FDG uptake within left breast masses c/w known carcinoma as well as in axillary LNs c/w known carcinoma. Increased uptake in the left proximal femur, ely mets cannot be excluded. Consider tissue sampling for further evaluation. \par \par -- 3/6/2020 ([facility]) left femur bone biopsy: benign bone, no carcinoma visualized. \par \par --11/9/2020 ([facility]) left mastectomy, ALND surgical pathology pending

## 2020-11-16 NOTE — PAST MEDICAL HISTORY
[Postmenopausal] : The patient is postmenopausal [unknown] : the patient is unsure of the date of her LMP [Total Preg ___] : G[unfilled] [Live Births ___] : P[unfilled]  [History of Hormone Replacement Treatment] : has no history of hormone replacement treatment [de-identified] : Does not remember first period [FreeTextEntry5] : None [FreeTextEntry6] : None [FreeTextEntry7] : None [FreeTextEntry8] : None

## 2020-11-20 ENCOUNTER — APPOINTMENT (OUTPATIENT)
Dept: BREAST CENTER | Facility: CLINIC | Age: 60
End: 2020-11-20
Payer: MEDICARE

## 2020-11-20 VITALS — OXYGEN SATURATION: 97 % | BODY MASS INDEX: 32.2 KG/M2 | WEIGHT: 175 LBS | HEIGHT: 62 IN | HEART RATE: 110 BPM

## 2020-11-20 PROCEDURE — 99024 POSTOP FOLLOW-UP VISIT: CPT

## 2020-11-24 PROBLEM — G80.9 CEREBRAL PALSY: Status: ACTIVE | Noted: 2020-11-24

## 2020-11-24 NOTE — DATA REVIEWED
[FreeTextEntry1] : --11/12/19 ([facility]) b/l screening mammo-cony showing a spiculated mass left outer breast with fine calcifications along with abnormal appearing lymph nodes in left axilla, highly suspicious for neoplastic process, additional diagnostic mammogram views, US recommended and if visualized on US, recommended for US guided biopsy of the lesion in left breast and left axillary lymph nodes recommended on day of diagnostic workup. Surgical consultation recommended. BI-RADS 5 \par  \par --2/4/2020 ([facility]) biopsy path: left 2:00-3:00n1-3 , 3.2 cm mass US bx, IDC, poorly differentiated, DCIS, high nuclear grade. Left 2:00-3:00n1-3, 1.9 cm mass, US bx, IDC poorly differentiated, spanning 1.7cm, DCIS high nuclear grade. Left axilla, US bx, metastatic adenocarcinoma. KI-67 is >70%; ER+ UT negative, HER2 equivocal, FISH negative. \par \par -- 2/26/2020 ([facility]) PET/CT: FDG uptake within left breast masses c/w known carcinoma as well as in axillary LNs c/w known carcinoma. Increased uptake in the left proximal femur, ely mets cannot be excluded. Consider tissue sampling for further evaluation. \par \par -- 3/6/2020 ([facility]) left femur bone biopsy: benign bone, no carcinoma visualized. \par \par --11/9/2020 ([facility]) left mastectomy, ALND surgical pathology: two sentinel lymph nodes positive for metastatic disease with extranodal extension, left axillary lymph node excision: one lymph node negative for tumor, left mastectomy: invasive ductal carcinoma, G3, 7mm, 5mm, and 2mm in greatest dimension, less than 1mm from posterior margin, DCIS high nuclear grade, solid and cribriform types, associated with central necrosis, lymph-vascular invasion identified, previous procedure site related change, left axillary content dissection one of two lymph nodes positive for metastatic carcinoma and extranodal extension, left palpable axillary lymph node excision: one of two lymph nodes positive for metastatic carcinoma and extranodal extension. Pathologic staging: mypT1b pN2a

## 2020-11-24 NOTE — REASON FOR VISIT
[Post Op: _________] : a [unfilled] post op visit [FreeTextEntry1] : left breast IDC, DCIS w/ axillary mets, s/p left mastectomy and left ALND

## 2020-11-24 NOTE — PAST MEDICAL HISTORY
[Postmenopausal] : The patient is postmenopausal [unknown] : the patient is unsure of the date of her LMP [Total Preg ___] : G[unfilled] [Live Births ___] : P[unfilled]  [History of Hormone Replacement Treatment] : has no history of hormone replacement treatment [de-identified] : Does not remember first period [FreeTextEntry5] : None [FreeTextEntry6] : None [FreeTextEntry7] : None [FreeTextEntry8] : None

## 2020-11-24 NOTE — HISTORY OF PRESENT ILLNESS
[FreeTextEntry1] : Emily is a 60 year old female who presents for post op evaluation for multifocal left breast IDC, DCIS at 5:00, 7mm, and 11:00, 5mm and 2mm , ER+ (95%), PA negative, HER2 Equivocal, FISH negative, with metastatic adenocarcinoma to the left axilla; KI67 is > 70%. S/p neoadjuvant chemotherapy. S/p left mastectomy with ALND on 11/6/2020, 4/7 lymph nodes were positive for metastatic disease, lymphovascular invasion present. PHUONG drains in place with serosanguineous fluid, approximately <30mL in past 24 hours. She denies fever or chills.  \par \par Will remove PHUONG drains today. Reviewed pathology with patient and caregiver. They both understand that the next step is radiation oncology consult for post mastectomy radiation. \par \par \par

## 2020-11-25 ENCOUNTER — APPOINTMENT (OUTPATIENT)
Dept: RADIATION ONCOLOGY | Facility: CLINIC | Age: 60
End: 2020-11-25

## 2020-11-25 DIAGNOSIS — G80.9 CEREBRAL PALSY, UNSPECIFIED: ICD-10-CM

## 2020-12-04 ENCOUNTER — APPOINTMENT (OUTPATIENT)
Dept: RADIATION ONCOLOGY | Facility: CLINIC | Age: 60
End: 2020-12-04
Payer: MEDICARE

## 2020-12-04 VITALS
BODY MASS INDEX: 32.01 KG/M2 | HEART RATE: 90 BPM | WEIGHT: 175 LBS | DIASTOLIC BLOOD PRESSURE: 73 MMHG | SYSTOLIC BLOOD PRESSURE: 128 MMHG | OXYGEN SATURATION: 100 % | RESPIRATION RATE: 16 BRPM

## 2020-12-04 DIAGNOSIS — Z92.21 PERSONAL HISTORY OF ANTINEOPLASTIC CHEMOTHERAPY: ICD-10-CM

## 2020-12-04 DIAGNOSIS — Z78.9 OTHER SPECIFIED HEALTH STATUS: ICD-10-CM

## 2020-12-04 PROCEDURE — 99072 ADDL SUPL MATRL&STAF TM PHE: CPT

## 2020-12-04 PROCEDURE — 99202 OFFICE O/P NEW SF 15 MIN: CPT

## 2020-12-04 NOTE — REVIEW OF SYSTEMS
[Patient Intake Form Reviewed] : Patient intake form was reviewed [Muscle Weakness] : muscle weakness [Gait Disturbance] : gait disturbance [Difficulty Walking] : difficulty walking [Negative] : Heme/Lymph [FreeTextEntry8] : +urinary incontinence. [de-identified] : s/p left mastectomy.

## 2020-12-04 NOTE — PHYSICAL EXAM
[Normal] : normal spine exam without palpable tenderness, no kyphosis or scoliosis [Oriented To Time, Place, And Person] : oriented to person, place, and time [Affect] : the affect was normal [de-identified] : She has stigmata of cerebral palsy [de-identified] : s/p left mastectomy. Well healing incisions to left CW and axilla. Well healing old PHUONG drain sites.  Right breast negative.   [de-identified] : difficulty ambulating, uses walker and is able to ambulate with assistance. RIght hemiplegia. [de-identified] : Well healing incisions to left CW and axilla. Well healing old PHUONG drain sites. [de-identified] : Right hemiplegia.

## 2020-12-04 NOTE — DISEASE MANAGEMENT
[FreeTextEntry4] : mypT1b pN2a [TTNM] : (m2)yp1b [NTNM] : yp2a [MTNM] : y0 [IIIB] : IIIB [de-identified] : Left chest wall

## 2021-01-13 ENCOUNTER — NON-APPOINTMENT (OUTPATIENT)
Age: 61
End: 2021-01-13

## 2021-01-15 NOTE — PHYSICAL EXAM
[Normal] : normal spine exam without palpable tenderness, no kyphosis or scoliosis [Oriented To Time, Place, And Person] : oriented to person, place, and time [Affect] : the affect was normal [Sclera] : the sclera and conjunctiva were normal [Outer Ear] : the ears and nose were normal in appearance [Hearing Threshold Finger Rub Not [place]] : hearing was normal [] : no respiratory distress [Respiration, Rhythm And Depth] : normal respiratory rhythm and effort [de-identified] : She has stigmata of cerebral palsy [de-identified] : s/p left mastectomy. Well healed incisions to left CW and axilla. Well healing old PHUONG drain sites.  Right breast negative.   [de-identified] : difficulty ambulating, uses walker and is able to ambulate with assistance. RIght hemiplegia. [de-identified] : Right hemiplegia.  [de-identified] : Well healed incisions to left CW and axilla. Well healing old PHUONG drain sites.

## 2021-01-15 NOTE — DISEASE MANAGEMENT
[Pathological] : TNM Stage: p [IIIB] : IIIB [FreeTextEntry4] : mypT1b pN2a [TTNM] : (m2)yp1b [NTNM] : yp2a [MTNM] : y0 [de-identified] : 5000 cGy [de-identified] : 600 cGy [de-identified] : Left chest wall and nodes

## 2021-01-15 NOTE — HISTORY OF PRESENT ILLNESS
[FreeTextEntry1] : 1/13/21- OTV- Ms. Nayak has completed 600 cGy/ 5000 cGy to the left CW and nodes.  Today, she reports she feels good and treatment is going well thus far. She has no complaints to include breast pain, swelling, dysphagia, or skin irritation.\par \par _____________________\par INITIAL EVAL (12/4/2020)\par Ms. Emily Nayak is a 60 year old post-menopausal female referred by Dr. 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 She is s/p neoadjuvant AC-T chemotherapy under the management of Dr. Jaramillo and s/p left mastectomy and left axillary lymph node dissection on 11/6/2020, mypT1b pN2a. \par \par Bilateral screening mammo done 11/12/19 ([facility]) showed the following: \par -A spiculated mass within the left outer breast with fine calcifications along with abnormal appearing lymph nodes in the left axilla, these findings are highly suspicious for neoplastic process, additional diagnostic mammographic views, ultrasound examination and if these findings are visualized on ultrasound then ultrasound-guided biopsy of the lesion in the left breast and left axillary lymph nodes is recommended on the day of diagnostic workup.\par BI-RADS 5. \par \par She established care with Dr. Ya on 1/15/2020, at which time was noted a palpable left breast mass on exam. On 2/4/2020, she underwent US guided core biopsy with microclip placements. Pathology revealed the following: \par 1. Breast, left, 2:00-3:00, 1-3 cmFN, 3.2 cm mass: Invasive ductal carcinoma, poorly-differentiated, spanning 1.2 cm in this material.\par Ductal carcinoma in situ (DCIS), solid type with high nuclear grade.\par 2. Breast, left, 2:00-3:00, 1-3 cm FN, 1.9 cm mass: Invasive ductal carcinoma, poorly-differentiated, spanning 1.7 cm in this material, morphologically similar to part 1. Ductal carcinoma in situ (DCIS), solid type with high nuclear grade.\par 3. Axilla, left, abnormal lymph node: Metastatic adenocarcinoma, spanning 5.5 mm in this material, morphologically similar to part 1.\par -ER positive (> 95% nuclear staining with strong 3+ intensity), CA negative, HER-2 equivocal, negative via FISH. \par -Ki-67 proliferation index is over 70%.\par \par PET/ CT done 2/26/2020 showed the following: \par -FDG uptake within  ill-defined left breast masses which are consistent with patient's known carcinoma.\par -Nonenlarged FDG avid axillary lymph nodes consistent with known janel metastasis. \par -Increased FDG avidity in the left proximal femur with a 4 cm lucent lesion without cortical disruption, bony metastasis cannot be excluded.  \par \par She had a biopsy of the left femur lesion on 3/6/2020; pathology was benign. \par \par She received neoadjuvant ACT chemotherapy under the management of Dr. Jaramillo from 3/26/2020 to 8/13/2020. \par \par PET/ CT done 8/28/2020 revealed the following: \par -Since 2/26/20, there is no longer abnormal activity in the left breast and left axilla.\par -There is a 0.8 cm sclerotic lesion in the L2 vertebral body, which is more dense and larger than on prior study. Although it is not hypermetabolic, it is still suspicious for metastatic disease. Recommend further evaluation with MRI and/or biopsy.\par -No change in hypermetabolic lucent lesion left femur. Recommend correlation with biopsy results.\par \par MRI Lumbar Spine done 9/14/2020 showed the following: \par -8 mm well-marginated lesion seen within right posterior aspect of L2 lumbar vertebral body demonstrating predominant hypointensity on all pulse sequences and no significant enhancement. This corresponds to a sclerotic lesion seen on CT scan of 8/28/2020. Differential remains a metastasis (osteoblastic or sclerotic) versus other etiologies such as intraosseous hemangioma, benign fibro-osseous lesion, or enostosis.\par -Shallow left paracentral disc protrusion L1-2 level and diffuse disc bulges from L2-L3 through to L5-S1 levels inclusive.\par -Central canal stenosis from L1-L2 through to L3-4 levels, greatest at L2-L3, which accounts for mild clumping of the cauda equina nerve roots.\par -Multilevel foraminal narrowing.\par -Multifocal uterine fibroids.\par \par Biopsy of the suspicious L2 lesion was done 9/30/2020; pathology was benign.\par \par Left mammo/ sono done 11/4/2020 showed the following: \par -1.5 x 0.9 x 1.2 cm irregular hypoechoic lesion at the 3:00 position, 3 cmFN, decreased in size, previously measured 1.9 x 1.2 x 1.5 cm, consistent with interval neoadjuvant therapy, known malignancy.\par -1.9 x 1.0 x 1.8 cm lobulated hypoechoic lesion 12:00 position, 3 cmFN (previously labeled 2-3 o'clock, 1 to 3 cmFN) with associated microclip, decreased in size from prior study measured 3.2 x 1.3 x 13.0 cm, consistent with interval neoadjuvant therapy, known malignancy.\par -Lymph node in the left axilla with focal mild cortical thickening to 0.5 cm, likely containing microclip from previous biopsy.\par \par On 11/6/2020, she underwent left mastectomy and left axillary lymph node dissection. Pathology revealed the following: \par 1. Left sentinel lymph node, excision: Two lymph nodes positive for metastatic carcinoma and extranodal extension (2/2).\par 2. Left axillary lymph node, excision: 0/1. \par 3. Left breast, mastectomy: Invasive ductal carcinoma, Grade 3. Measuring 7 mm, 5 mm and 2 mm in greatest dimension. Less than 1 mm from posterior margin. Ductal carcinoma in situ (DCIS), high nuclear grade, solid and cribriform types, associated with central necrosis. Lymph-vascular invasion identified.\par 4. Left axillary content, dissection: One of two lymph nodes positive for metastatic carcinoma and extranodal extension (1/2).\par 5. Left palpable axillary lymph node, excision: One of two lymph node positive for metastatic carcinoma and extranodal extension\par (1/2).\par Pathologic staging (pTNM): mypT1b pN2a\par \par She most recently followed up with Dr. Ya on 11/20/2020, at which time PHUONG drains were removed. She is scheduled to follow up with Dr. Jaramillo on 12/18.\par \par Today, she reports she feels well. SHe denies chest wall pain, edema, CP, SOB, fatigue. She does note she is prone to hiccups and is taking Zantac for this. SHe notes she tolerated chemotherapy well with the exception of fatigue. \par \par Of note, patient has Cerebral Palsy and has a live-in caregiver. Patient's father makes medical decisions for the patient. \par \par Patient lives in the [place], gets to medical appointments via [employer] or taxi. \par

## 2021-01-15 NOTE — REVIEW OF SYSTEMS
[Patient Intake Form Reviewed] : Patient intake form was reviewed [Muscle Weakness] : muscle weakness [Gait Disturbance] : gait disturbance [Difficulty Walking] : difficulty walking [Negative] : Heme/Lymph [Dysphagia: Grade 0] : Dysphagia: Grade 0 [Fatigue: Grade 0] : Fatigue: Grade 0 [Dermatitis Radiation: Grade 0] : Dermatitis Radiation: Grade 0 [FreeTextEntry8] : +urinary incontinence. [de-identified] : s/p left mastectomy.

## 2021-01-20 ENCOUNTER — NON-APPOINTMENT (OUTPATIENT)
Age: 61
End: 2021-01-20

## 2021-01-20 ENCOUNTER — EMERGENCY (EMERGENCY)
Facility: HOSPITAL | Age: 61
LOS: 1 days | Discharge: HOME | End: 2021-01-20
Attending: EMERGENCY MEDICINE | Admitting: EMERGENCY MEDICINE
Payer: MEDICAID

## 2021-01-20 VITALS
DIASTOLIC BLOOD PRESSURE: 89 MMHG | SYSTOLIC BLOOD PRESSURE: 139 MMHG | OXYGEN SATURATION: 98 % | RESPIRATION RATE: 16 BRPM | TEMPERATURE: 98 F | HEART RATE: 90 BPM | HEIGHT: 62 IN

## 2021-01-20 DIAGNOSIS — Z53.21 PROCEDURE AND TREATMENT NOT CARRIED OUT DUE TO PATIENT LEAVING PRIOR TO BEING SEEN BY HEALTH CARE PROVIDER: ICD-10-CM

## 2021-01-20 PROCEDURE — L9991: CPT

## 2021-01-20 NOTE — ED ADULT NURSE NOTE - NS_CALLED_NO_RESPONSE_ED_ALL_ED
Pt and Aid unable to be found after multiple attempts.  Never seen by bedside RN or provider/Patient called and no answer

## 2021-01-20 NOTE — ED ADULT NURSE NOTE - CHIEF COMPLAINT QUOTE
Pt brought in by home health aide stating they went to radiology and were told "go to the doctor on the first floor." Pt unsure why she was sent in, states she had an appointment but unsure why. Denies any current symptoms.

## 2021-01-20 NOTE — HISTORY OF PRESENT ILLNESS
[FreeTextEntry1] : 1/20/21- OTV- Ms. Nayak has completed 1400 cGy/ 5000 cGy to the left CW and nodes.  Today, she reports she continues to feel well with no complaints to include breast pain, swelling, dysphagia, or skin irritation.\par \par 1/13/21- OTV- Ms. Nayak has completed 600 cGy/ 5000 cGy to the left CW and nodes.  Today, she reports she feels good and treatment is going well thus far. She has no complaints to include breast pain, swelling, dysphagia, or skin irritation.\par \par _____________________\par INITIAL EVAL (12/4/2020)\par Ms. Emily Nayak is a 60 year old post-menopausal female referred by Dr. Ya for consideration of post- mastectomy radiation therapy for a left breast IDC with mets to axilla, ER positive, [place] negative, HER-2 equivocal, FISH negative, Ki67 > 70%. She is s/p neoadjuvant AC-T chemotherapy under the management of Dr. Jaramillo and s/p left mastectomy and left axillary lymph node dissection on 11/6/2020, mypT1b pN2a. \par \par Bilateral screening mammo done 11/12/19 ([facility]) showed the following: \par -A spiculated mass within the left outer breast with fine calcifications along with abnormal appearing lymph nodes in the left axilla, these findings are highly suspicious for neoplastic process, additional diagnostic mammographic views, ultrasound examination and if these findings are visualized on ultrasound then ultrasound-guided biopsy of the lesion in the left breast and left axillary lymph nodes is recommended on the day of diagnostic workup.\par BI-RADS 5. \par \par She established care with Dr. Ya on 1/15/2020, at which time was noted a palpable left breast mass on exam. On 2/4/2020, she underwent US guided core biopsy with microclip placements. Pathology revealed the following: \par 1. Breast, left, 2:00-3:00, 1-3 cmFN, 3.2 cm mass: Invasive ductal carcinoma, poorly-differentiated, spanning 1.2 cm in this material.\par Ductal carcinoma in situ (DCIS), solid type with high nuclear grade.\par 2. Breast, left, 2:00-3:00, 1-3 cm FN, 1.9 cm mass: Invasive ductal carcinoma, poorly-differentiated, spanning 1.7 cm in this material, morphologically similar to part 1. Ductal carcinoma in situ (DCIS), solid type with high nuclear grade.\par 3. Axilla, left, abnormal lymph node: Metastatic adenocarcinoma, spanning 5.5 mm in this material, morphologically similar to part 1.\par -ER positive (> 95% nuclear staining with strong 3+ intensity), AZ negative, HER-2 equivocal, negative via FISH. \par -Ki-67 proliferation index is over 70%.\par \par PET/ CT done 2/26/2020 showed the following: \par -FDG uptake within  ill-defined left breast masses which are consistent with patient's known carcinoma.\par -Nonenlarged FDG avid axillary lymph nodes consistent with known janel metastasis. \par -Increased FDG avidity in the left proximal femur with a 4 cm lucent lesion without cortical disruption, bony metastasis cannot be excluded.  \par \par She had a biopsy of the left femur lesion on 3/6/2020; pathology was benign. \par \par She received neoadjuvant ACT chemotherapy under the management of Dr. Jaramillo from 3/26/2020 to 8/13/2020. \par \par PET/ CT done 8/28/2020 revealed the following: \par -Since 2/26/20, there is no longer abnormal activity in the left breast and left axilla.\par -There is a 0.8 cm sclerotic lesion in the L2 vertebral body, which is more dense and larger than on prior study. Although it is not hypermetabolic, it is still suspicious for metastatic disease. Recommend further evaluation with MRI and/or biopsy.\par -No change in hypermetabolic lucent lesion left femur. Recommend correlation with biopsy results.\par \par MRI Lumbar Spine done 9/14/2020 showed the following: \par -8 mm well-marginated lesion seen within right posterior aspect of L2 lumbar vertebral body demonstrating predominant hypointensity on all pulse sequences and no significant enhancement. This corresponds to a sclerotic lesion seen on CT scan of 8/28/2020. Differential remains a metastasis (osteoblastic or sclerotic) versus other etiologies such as intraosseous hemangioma, benign fibro-osseous lesion, or enostosis.\par -Shallow left paracentral disc protrusion L1-2 level and diffuse disc bulges from L2-L3 through to L5-S1 levels inclusive.\par -Central canal stenosis from L1-L2 through to L3-4 levels, greatest at L2-L3, which accounts for mild clumping of the cauda equina nerve roots.\par -Multilevel foraminal narrowing.\par -Multifocal uterine fibroids.\par \par Biopsy of the suspicious L2 lesion was done 9/30/2020; pathology was benign.\par \par Left mammo/ sono done 11/4/2020 showed the following: \par -1.5 x 0.9 x 1.2 cm irregular hypoechoic lesion at the 3:00 position, 3 cmFN, decreased in size, previously measured 1.9 x 1.2 x 1.5 cm, consistent with interval neoadjuvant therapy, known malignancy.\par -1.9 x 1.0 x 1.8 cm lobulated hypoechoic lesion 12:00 position, 3 cmFN (previously labeled 2-3 o'clock, 1 to 3 cmFN) with associated microclip, decreased in size from prior study measured 3.2 x 1.3 x 13.0 cm, consistent with interval neoadjuvant therapy, known malignancy.\par -Lymph node in the left axilla with focal mild cortical thickening to 0.5 cm, likely containing microclip from previous biopsy.\par \par On 11/6/2020, she underwent left mastectomy and left axillary lymph node dissection. Pathology revealed the following: \par 1. Left sentinel lymph node, excision: Two lymph nodes positive for metastatic carcinoma and extranodal extension (2/2).\par 2. Left axillary lymph node, excision: 0/1. \par 3. Left breast, mastectomy: Invasive ductal carcinoma, Grade 3. Measuring 7 mm, 5 mm and 2 mm in greatest dimension. Less than 1 mm from posterior margin. Ductal carcinoma in situ (DCIS), high nuclear grade, solid and cribriform types, associated with central necrosis. Lymph-vascular invasion identified.\par 4. Left axillary content, dissection: One of two lymph nodes positive for metastatic carcinoma and extranodal extension (1/2).\par 5. Left palpable axillary lymph node, excision: One of two lymph node positive for metastatic carcinoma and extranodal extension\par (1/2).\par Pathologic staging (pTNM): mypT1b pN2a\par \par She most recently followed up with Dr. Ya on 11/20/2020, at which time PHUONG drains were removed. She is scheduled to follow up with Dr. Jaramillo on 12/18.\par \par Today, she reports she feels well. SHe denies chest wall pain, edema, CP, SOB, fatigue. She does note she is prone to hiccups and is taking Zantac for this. SHe notes she tolerated chemotherapy well with the exception of fatigue. \par \par Of note, patient has Cerebral Palsy and has a live-in caregiver. Patient's father makes medical decisions for the patient. \par \par Patient lives in the [place], gets to medical appointments via [employer] or taxi. \par

## 2021-01-20 NOTE — DISEASE MANAGEMENT
[Pathological] : TNM Stage: p [IIIB] : IIIB [FreeTextEntry4] : mypT1b pN2a [TTNM] : (m2)yp1b [NTNM] : yp2a [MTNM] : y0 [de-identified] : 600 cGy [de-identified] : 5000 cGy [de-identified] : Left chest wall and nodes

## 2021-01-20 NOTE — PHYSICAL EXAM
[Sclera] : the sclera and conjunctiva were normal [Outer Ear] : the ears and nose were normal in appearance [Hearing Threshold Finger Rub Not [place]] : hearing was normal [] : no respiratory distress [Respiration, Rhythm And Depth] : normal respiratory rhythm and effort [Normal] : normal spine exam without palpable tenderness, no kyphosis or scoliosis [Oriented To Time, Place, And Person] : oriented to person, place, and time [Affect] : the affect was normal [de-identified] : She has stigmata of cerebral palsy [de-identified] : s/p left mastectomy. Well healed incisions to left CW and axilla. Well healing old PHUONG drain sites. No dermatitis noted. Right breast negative.   [de-identified] : difficulty ambulating, uses walker and is able to ambulate with assistance. RIght hemiplegia. [de-identified] : Well healed incisions to left CW and axilla. Well healing old PHUONG drain sites. No dermatitis noted. [de-identified] : Right hemiplegia.

## 2021-01-20 NOTE — REVIEW OF SYSTEMS
[Dysphagia: Grade 0] : Dysphagia: Grade 0 [Fatigue: Grade 0] : Fatigue: Grade 0 [Dermatitis Radiation: Grade 0] : Dermatitis Radiation: Grade 0 [Patient Intake Form Reviewed] : Patient intake form was reviewed [Muscle Weakness] : muscle weakness [Gait Disturbance] : gait disturbance [Difficulty Walking] : difficulty walking [Negative] : Heme/Lymph [FreeTextEntry8] : +urinary incontinence. [de-identified] : s/p left mastectomy.

## 2021-01-27 ENCOUNTER — NON-APPOINTMENT (OUTPATIENT)
Age: 61
End: 2021-01-27

## 2021-01-29 NOTE — PHYSICAL EXAM
[Sclera] : the sclera and conjunctiva were normal [Outer Ear] : the ears and nose were normal in appearance [Hearing Threshold Finger Rub Not [place]] : hearing was normal [] : no respiratory distress [Respiration, Rhythm And Depth] : normal respiratory rhythm and effort [Normal] : normal spine exam without palpable tenderness, no kyphosis or scoliosis [Oriented To Time, Place, And Person] : oriented to person, place, and time [Affect] : the affect was normal [de-identified] : She has stigmata of cerebral palsy [de-identified] : s/p left mastectomy. Well healed incisions to left CW and axilla. Well healing old PHUONG drain sites. No dermatitis noted. Right breast negative.   [de-identified] : difficulty ambulating, uses walker and is able to ambulate with assistance. RIght hemiplegia. [de-identified] : Well healed incisions to left CW and axilla. Well healing old PHUONG drain sites. No dermatitis noted. [de-identified] : Right hemiplegia.

## 2021-01-29 NOTE — DISEASE MANAGEMENT
[Pathological] : TNM Stage: p [IIIB] : IIIB [FreeTextEntry4] : mypT1b pN2a [TTNM] : (m2)yp1b [NTNM] : yp2a [MTNM] : y0 [de-identified] : 2400 cGy [de-identified] : 5000 cGy [de-identified] : Left chest wall and nodes

## 2021-01-29 NOTE — HISTORY OF PRESENT ILLNESS
[FreeTextEntry1] : 1/27/21- OTV- Ms. Nayak has completed 2400 cGy/ 5000 cGy to the left CW and nodes.  Today, she notes she continues to feel well and denies any complaints to include breast pain, swelling, dysphagia, fatigue, or skin irritation. She is using Aquaphor twice daily. \par \par 1/20/21- OTV- Ms. Nayak has completed 1400 cGy/ 5000 cGy to the left CW and nodes.  Today, she reports she continues to feel well with no complaints to include breast pain, swelling, dysphagia, or skin irritation.\par \par 1/13/21- OTV- Ms. Nayak has completed 600 cGy/ 5000 cGy to the left CW and nodes.  Today, she reports she feels good and treatment is going well thus far. She has no complaints to include breast pain, swelling, dysphagia, or skin irritation.\par \par _____________________\par INITIAL EVAL (12/4/2020)\par Ms. Emily Nayak is a 60 year old post-menopausal female referred by Dr. 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 She is s/p neoadjuvant AC-T chemotherapy under the management of Dr. Jaramillo and s/p left mastectomy and left axillary lymph node dissection on 11/6/2020, mypT1b pN2a. \par \par Bilateral screening mammo done 11/12/19 ([facility]) showed the following: \par -A spiculated mass within the left outer breast with fine calcifications along with abnormal appearing lymph nodes in the left axilla, these findings are highly suspicious for neoplastic process, additional diagnostic mammographic views, ultrasound examination and if these findings are visualized on ultrasound then ultrasound-guided biopsy of the lesion in the left breast and left axillary lymph nodes is recommended on the day of diagnostic workup.\par BI-RADS 5. \par \par She established care with Dr. Ya on 1/15/2020, at which time was noted a palpable left breast mass on exam. On 2/4/2020, she underwent US guided core biopsy with microclip placements. Pathology revealed the following: \par 1. Breast, left, 2:00-3:00, 1-3 cmFN, 3.2 cm mass: Invasive ductal carcinoma, poorly-differentiated, spanning 1.2 cm in this material.\par Ductal carcinoma in situ (DCIS), solid type with high nuclear grade.\par 2. Breast, left, 2:00-3:00, 1-3 cm FN, 1.9 cm mass: Invasive ductal carcinoma, poorly-differentiated, spanning 1.7 cm in this material, morphologically similar to part 1. Ductal carcinoma in situ (DCIS), solid type with high nuclear grade.\par 3. Axilla, left, abnormal lymph node: Metastatic adenocarcinoma, spanning 5.5 mm in this material, morphologically similar to part 1.\par -ER positive (> 95% nuclear staining with strong 3+ intensity), RI negative, HER-2 equivocal, negative via FISH. \par -Ki-67 proliferation index is over 70%.\par \par PET/ CT done 2/26/2020 showed the following: \par -FDG uptake within  ill-defined left breast masses which are consistent with patient's known carcinoma.\par -Nonenlarged FDG avid axillary lymph nodes consistent with known janel metastasis. \par -Increased FDG avidity in the left proximal femur with a 4 cm lucent lesion without cortical disruption, bony metastasis cannot be excluded.  \par \par She had a biopsy of the left femur lesion on 3/6/2020; pathology was benign. \par \par She received neoadjuvant ACT chemotherapy under the management of Dr. Jaramillo from 3/26/2020 to 8/13/2020. \par \par PET/ CT done 8/28/2020 revealed the following: \par -Since 2/26/20, there is no longer abnormal activity in the left breast and left axilla.\par -There is a 0.8 cm sclerotic lesion in the L2 vertebral body, which is more dense and larger than on prior study. Although it is not hypermetabolic, it is still suspicious for metastatic disease. Recommend further evaluation with MRI and/or biopsy.\par -No change in hypermetabolic lucent lesion left femur. Recommend correlation with biopsy results.\par \par MRI Lumbar Spine done 9/14/2020 showed the following: \par -8 mm well-marginated lesion seen within right posterior aspect of L2 lumbar vertebral body demonstrating predominant hypointensity on all pulse sequences and no significant enhancement. This corresponds to a sclerotic lesion seen on CT scan of 8/28/2020. Differential remains a metastasis (osteoblastic or sclerotic) versus other etiologies such as intraosseous hemangioma, benign fibro-osseous lesion, or enostosis.\par -Shallow left paracentral disc protrusion L1-2 level and diffuse disc bulges from L2-L3 through to L5-S1 levels inclusive.\par -Central canal stenosis from L1-L2 through to L3-4 levels, greatest at L2-L3, which accounts for mild clumping of the cauda equina nerve roots.\par -Multilevel foraminal narrowing.\par -Multifocal uterine fibroids.\par \par Biopsy of the suspicious L2 lesion was done 9/30/2020; pathology was benign.\par \par Left mammo/ sono done 11/4/2020 showed the following: \par -1.5 x 0.9 x 1.2 cm irregular hypoechoic lesion at the 3:00 position, 3 cmFN, decreased in size, previously measured 1.9 x 1.2 x 1.5 cm, consistent with interval neoadjuvant therapy, known malignancy.\par -1.9 x 1.0 x 1.8 cm lobulated hypoechoic lesion 12:00 position, 3 cmFN (previously labeled 2-3 o'clock, 1 to 3 cmFN) with associated microclip, decreased in size from prior study measured 3.2 x 1.3 x 13.0 cm, consistent with interval neoadjuvant therapy, known malignancy.\par -Lymph node in the left axilla with focal mild cortical thickening to 0.5 cm, likely containing microclip from previous biopsy.\par \par On 11/6/2020, she underwent left mastectomy and left axillary lymph node dissection. Pathology revealed the following: \par 1. Left sentinel lymph node, excision: Two lymph nodes positive for metastatic carcinoma and extranodal extension (2/2).\par 2. Left axillary lymph node, excision: 0/1. \par 3. Left breast, mastectomy: Invasive ductal carcinoma, Grade 3. Measuring 7 mm, 5 mm and 2 mm in greatest dimension. Less than 1 mm from posterior margin. Ductal carcinoma in situ (DCIS), high nuclear grade, solid and cribriform types, associated with central necrosis. Lymph-vascular invasion identified.\par 4. Left axillary content, dissection: One of two lymph nodes positive for metastatic carcinoma and extranodal extension (1/2).\par 5. Left palpable axillary lymph node, excision: One of two lymph node positive for metastatic carcinoma and extranodal extension\par (1/2).\par Pathologic staging (pTNM): mypT1b pN2a\par \par She most recently followed up with Dr. Ya on 11/20/2020, at which time PHUONG drains were removed. She is scheduled to follow up with Dr. Jaramillo on 12/18.\par \par Today, she reports she feels well. SHe denies chest wall pain, edema, CP, SOB, fatigue. She does note she is prone to hiccups and is taking Zantac for this. SHe notes she tolerated chemotherapy well with the exception of fatigue. \par \par Of note, patient has Cerebral Palsy and has a live-in caregiver. Patient's father makes medical decisions for the patient. \par \par Patient lives in the [place], gets to medical appointments via [employer] or taxi. \par

## 2021-01-29 NOTE — REVIEW OF SYSTEMS
[Dysphagia: Grade 0] : Dysphagia: Grade 0 [Fatigue: Grade 0] : Fatigue: Grade 0 [Dermatitis Radiation: Grade 0] : Dermatitis Radiation: Grade 0 [Muscle Weakness] : muscle weakness [Patient Intake Form Reviewed] : Patient intake form was reviewed [Gait Disturbance] : gait disturbance [Difficulty Walking] : difficulty walking [Negative] : Heme/Lymph [FreeTextEntry8] : +urinary incontinence. [de-identified] : s/p left mastectomy.

## 2021-02-03 ENCOUNTER — NON-APPOINTMENT (OUTPATIENT)
Age: 61
End: 2021-02-03

## 2021-02-05 ENCOUNTER — NON-APPOINTMENT (OUTPATIENT)
Age: 61
End: 2021-02-05

## 2021-02-10 ENCOUNTER — NON-APPOINTMENT (OUTPATIENT)
Age: 61
End: 2021-02-10

## 2021-02-10 NOTE — PHYSICAL EXAM
[Sclera] : the sclera and conjunctiva were normal [Outer Ear] : the ears and nose were normal in appearance [Hearing Threshold Finger Rub Not [place]] : hearing was normal [] : no respiratory distress [Respiration, Rhythm And Depth] : normal respiratory rhythm and effort [Normal] : normal spine exam without palpable tenderness, no kyphosis or scoliosis [Oriented To Time, Place, And Person] : oriented to person, place, and time [Affect] : the affect was normal [de-identified] : She has stigmata of cerebral palsy [de-identified] : s/p left mastectomy. Well healed incisions to left CW and axilla. Well healing old PHUONG drain sites. No dermatitis noted. Right breast negative.   [de-identified] : difficulty ambulating, uses walker and is able to ambulate with assistance. RIght hemiplegia. [de-identified] : Well healed incisions to left CW and axilla. Well healing old PHUONG drain sites. No dermatitis noted. [de-identified] : Right hemiplegia.

## 2021-02-10 NOTE — REVIEW OF SYSTEMS
[Dysphagia: Grade 0] : Dysphagia: Grade 0 [Fatigue: Grade 0] : Fatigue: Grade 0 [Dermatitis Radiation: Grade 0] : Dermatitis Radiation: Grade 0 [Patient Intake Form Reviewed] : Patient intake form was reviewed [Muscle Weakness] : muscle weakness [Gait Disturbance] : gait disturbance [Difficulty Walking] : difficulty walking [Negative] : Heme/Lymph [FreeTextEntry8] : +urinary incontinence. [de-identified] : s/p left mastectomy.
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
## 2021-02-10 NOTE — REVIEW OF SYSTEMS
[Dysphagia: Grade 0] : Dysphagia: Grade 0 [Fatigue: Grade 0] : Fatigue: Grade 0 [Dermatitis Radiation: Grade 0] : Dermatitis Radiation: Grade 0 [Patient Intake Form Reviewed] : Patient intake form was reviewed [Muscle Weakness] : muscle weakness [Gait Disturbance] : gait disturbance [Difficulty Walking] : difficulty walking [Negative] : Heme/Lymph [FreeTextEntry8] : +urinary incontinence. [de-identified] : s/p left mastectomy.

## 2021-02-10 NOTE — HISTORY OF PRESENT ILLNESS
[FreeTextEntry1] : 2/3/21- OTV- Ms. Nayak has completed 3000 cGy/ 5000 cGy to the left CW and nodes.  Today, she\par \par 1/27/21- OTV- Ms. Nayak has completed 2400 cGy/ 5000 cGy to the left CW and nodes.  Today, she notes she continues to feel well and denies any complaints to include breast pain, swelling, dysphagia, fatigue, or skin irritation. She is using Aquaphor twice daily. \par \par 1/20/21- OTV- Ms. Nayak has completed 1400 cGy/ 5000 cGy to the left CW and nodes.  Today, she reports she continues to feel well with no complaints to include breast pain, swelling, dysphagia, or skin irritation.\par \par 1/13/21- OTV- Ms. Nayak has completed 600 cGy/ 5000 cGy to the left CW and nodes.  Today, she reports she feels good and treatment is going well thus far. She has no complaints to include breast pain, swelling, dysphagia, or skin irritation.\par \par _____________________\par INITIAL EVAL (12/4/2020)\par Ms. Emily Nayak is a 60 year old post-menopausal female referred by Dr. Ya for consideration of post- mastectomy radiation therapy for a left breast IDC with mets to axilla, ER positive, [place] negative, HER-2 equivocal, FISH negative, Ki67 > 70%. She is s/p neoadjuvant AC-T chemotherapy under the management of Dr. Jaramillo and s/p left mastectomy and left axillary lymph node dissection on 11/6/2020, mypT1b pN2a. \par \par Bilateral screening mammo done 11/12/19 ([facility]) showed the following: \par -A spiculated mass within the left outer breast with fine calcifications along with abnormal appearing lymph nodes in the left axilla, these findings are highly suspicious for neoplastic process, additional diagnostic mammographic views, ultrasound examination and if these findings are visualized on ultrasound then ultrasound-guided biopsy of the lesion in the left breast and left axillary lymph nodes is recommended on the day of diagnostic workup.\par BI-RADS 5. \par \par She established care with Dr. Ya on 1/15/2020, at which time was noted a palpable left breast mass on exam. On 2/4/2020, she underwent US guided core biopsy with microclip placements. Pathology revealed the following: \par 1. Breast, left, 2:00-3:00, 1-3 cmFN, 3.2 cm mass: Invasive ductal carcinoma, poorly-differentiated, spanning 1.2 cm in this material.\par Ductal carcinoma in situ (DCIS), solid type with high nuclear grade.\par 2. Breast, left, 2:00-3:00, 1-3 cm FN, 1.9 cm mass: Invasive ductal carcinoma, poorly-differentiated, spanning 1.7 cm in this material, morphologically similar to part 1. Ductal carcinoma in situ (DCIS), solid type with high nuclear grade.\par 3. Axilla, left, abnormal lymph node: Metastatic adenocarcinoma, spanning 5.5 mm in this material, morphologically similar to part 1.\par -ER positive (> 95% nuclear staining with strong 3+ intensity), [place] negative, HER-2 equivocal, negative via FISH. \par -Ki-67 proliferation index is over 70%.\par \par PET/ CT done 2/26/2020 showed the following: \par -FDG uptake within  ill-defined left breast masses which are consistent with patient's known carcinoma.\par -Nonenlarged FDG avid axillary lymph nodes consistent with known janel metastasis. \par -Increased FDG avidity in the left proximal femur with a 4 cm lucent lesion without cortical disruption, bony metastasis cannot be excluded.  \par \par She had a biopsy of the left femur lesion on 3/6/2020; pathology was benign. \par \par She received neoadjuvant ACT chemotherapy under the management of Dr. Jaramillo from 3/26/2020 to 8/13/2020. \par \par PET/ CT done 8/28/2020 revealed the following: \par -Since 2/26/20, there is no longer abnormal activity in the left breast and left axilla.\par -There is a 0.8 cm sclerotic lesion in the L2 vertebral body, which is more dense and larger than on prior study. Although it is not hypermetabolic, it is still suspicious for metastatic disease. Recommend further evaluation with MRI and/or biopsy.\par -No change in hypermetabolic lucent lesion left femur. Recommend correlation with biopsy results.\par \par MRI Lumbar Spine done 9/14/2020 showed the following: \par -8 mm well-marginated lesion seen within right posterior aspect of L2 lumbar vertebral body demonstrating predominant hypointensity on all pulse sequences and no significant enhancement. This corresponds to a sclerotic lesion seen on CT scan of 8/28/2020. Differential remains a metastasis (osteoblastic or sclerotic) versus other etiologies such as intraosseous hemangioma, benign fibro-osseous lesion, or enostosis.\par -Shallow left paracentral disc protrusion L1-2 level and diffuse disc bulges from L2-L3 through to L5-S1 levels inclusive.\par -Central canal stenosis from L1-L2 through to L3-4 levels, greatest at L2-L3, which accounts for mild clumping of the cauda equina nerve roots.\par -Multilevel foraminal narrowing.\par -Multifocal uterine fibroids.\par \par Biopsy of the suspicious L2 lesion was done 9/30/2020; pathology was benign.\par \par Left mammo/ sono done 11/4/2020 showed the following: \par -1.5 x 0.9 x 1.2 cm irregular hypoechoic lesion at the 3:00 position, 3 cmFN, decreased in size, previously measured 1.9 x 1.2 x 1.5 cm, consistent with interval neoadjuvant therapy, known malignancy.\par -1.9 x 1.0 x 1.8 cm lobulated hypoechoic lesion 12:00 position, 3 cmFN (previously labeled 2-3 o'clock, 1 to 3 cmFN) with associated microclip, decreased in size from prior study measured 3.2 x 1.3 x 13.0 cm, consistent with interval neoadjuvant therapy, known malignancy.\par -Lymph node in the left axilla with focal mild cortical thickening to 0.5 cm, likely containing microclip from previous biopsy.\par \par On 11/6/2020, she underwent left mastectomy and left axillary lymph node dissection. Pathology revealed the following: \par 1. Left sentinel lymph node, excision: Two lymph nodes positive for metastatic carcinoma and extranodal extension (2/2).\par 2. Left axillary lymph node, excision: 0/1. \par 3. Left breast, mastectomy: Invasive ductal carcinoma, Grade 3. Measuring 7 mm, 5 mm and 2 mm in greatest dimension. Less than 1 mm from posterior margin. Ductal carcinoma in situ (DCIS), high nuclear grade, solid and cribriform types, associated with central necrosis. Lymph-vascular invasion identified.\par 4. Left axillary content, dissection: One of two lymph nodes positive for metastatic carcinoma and extranodal extension (1/2).\par 5. Left palpable axillary lymph node, excision: One of two lymph node positive for metastatic carcinoma and extranodal extension\par (1/2).\par Pathologic staging (pTNM): mypT1b pN2a\par \par She most recently followed up with Dr. Ya on 11/20/2020, at which time PHUONG drains were removed. She is scheduled to follow up with Dr. Jaramillo on 12/18.\par \par Today, she reports she feels well. SHe denies chest wall pain, edema, CP, SOB, fatigue. She does note she is prone to hiccups and is taking Zantac for this. SHe notes she tolerated chemotherapy well with the exception of fatigue. \par \par Of note, patient has Cerebral Palsy and has a live-in caregiver. Patient's father makes medical decisions for the patient. \par \par Patient lives in the [place], gets to medical appointments via [employer] or taxi. \par

## 2021-02-10 NOTE — PHYSICAL EXAM
[Sclera] : the sclera and conjunctiva were normal [Hearing Threshold Finger Rub Not [place]] : hearing was normal [Outer Ear] : the ears and nose were normal in appearance [] : no respiratory distress [Respiration, Rhythm And Depth] : normal respiratory rhythm and effort [Normal] : normal spine exam without palpable tenderness, no kyphosis or scoliosis [Oriented To Time, Place, And Person] : oriented to person, place, and time [Affect] : the affect was normal [de-identified] : She has stigmata of cerebral palsy [de-identified] : s/p left mastectomy. Well healed incisions to left CW and axilla. Well healing old PHUONG drain sites. No dermatitis noted. Right breast negative.   [de-identified] : difficulty ambulating, uses walker and is able to ambulate with assistance. RIght hemiplegia. [de-identified] : Well healed incisions to left CW and axilla. Well healing old PHUONG drain sites. No dermatitis noted. [de-identified] : Right hemiplegia.

## 2021-02-10 NOTE — DISEASE MANAGEMENT
[Pathological] : TNM Stage: p [IIIB] : IIIB [FreeTextEntry4] : mypT1b pN2a [NTNM] : yp2a [TTNM] : (m2)yp1b [MTNM] : y0 [de-identified] : 3400 cGy [de-identified] : 5000 cGy [de-identified] : Left chest wall and nodes

## 2021-02-10 NOTE — DISEASE MANAGEMENT
[Pathological] : TNM Stage: p [IIIB] : IIIB [FreeTextEntry4] : mypT1b pN2a [TTNM] : (m2)yp1b [NTNM] : yp2a [MTNM] : y0 [de-identified] : 3000 cGy [de-identified] : 5000 cGy [de-identified] : Left chest wall and nodes

## 2021-02-18 ENCOUNTER — NON-APPOINTMENT (OUTPATIENT)
Age: 61
End: 2021-02-18

## 2021-02-24 NOTE — DISEASE MANAGEMENT
[Pathological] : TNM Stage: p [IIIB] : IIIB [FreeTextEntry4] : mypT1b pN2a [TTNM] : (m2)yp1b [NTNM] : yp2a [MTNM] : y0 [de-identified] : 4400 cGy [de-identified] : 5000 cGy [de-identified] : Left chest wall and nodes

## 2021-02-24 NOTE — HISTORY OF PRESENT ILLNESS
[FreeTextEntry1] : 2/18/21- OTV- Ms. Nayak has completed 4400 cGy/ 5000 cGy to the left CW and nodes.  Today, she reports she continues to do well. Denies CW pain, edema, pruritus, skin irritation, dysphagia, SOB. Her HHA notes patient has not been using Aquaphor. She also states she went to the lab for UA and Ucx; results not back yet.\par \par 2/10/21- OTV- Ms. Nayak has completed 3400 cGy/ 5000 cGy to the left CW and nodes.  Today, she is doing well. No issues or complaint. Patient on the wheel chair. Advised to apply Aquaphor to the left chest wall.  She has some urinary discomfort and frequency, unrelated to her treatment.  we will send for UA/UCx.  cont RT as planned.\par \par 2/3/21- Patient declined to come in for treatment yesterday or today due to snowstorm. She is wheelchair bound and concerned about her safety due to snow/ ice on the ground. \par \par 1/27/21- OTV- Ms. Nayak has completed 2400 cGy/ 5000 cGy to the left CW and nodes.  Today, she notes she continues to feel well and denies any complaints to include breast pain, swelling, dysphagia, fatigue, or skin irritation. She is using Aquaphor twice daily. \par \par 1/20/21- OTV- Ms. Nayak has completed 1400 cGy/ 5000 cGy to the left CW and nodes.  Today, she reports she continues to feel well with no complaints to include breast pain, swelling, dysphagia, or skin irritation.\par \par 1/13/21- OTV- Ms. Nayak has completed 600 cGy/ 5000 cGy to the left CW and nodes.  Today, she reports she feels good and treatment is going well thus far. She has no complaints to include breast pain, swelling, dysphagia, or skin irritation.\par \par _____________________\par INITIAL EVAL (12/4/2020)\par Ms. Emily Nayak is a 60 year old post-menopausal female referred by Dr. 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 She is s/p neoadjuvant AC-T chemotherapy under the management of Dr. Jaramillo and s/p left mastectomy and left axillary lymph node dissection on 11/6/2020, mypT1b pN2a. \par \par Bilateral screening mammo done 11/12/19 ([facility]) showed the following: \par -A spiculated mass within the left outer breast with fine calcifications along with abnormal appearing lymph nodes in the left axilla, these findings are highly suspicious for neoplastic process, additional diagnostic mammographic views, ultrasound examination and if these findings are visualized on ultrasound then ultrasound-guided biopsy of the lesion in the left breast and left axillary lymph nodes is recommended on the day of diagnostic workup.\par BI-RADS 5. \par \par She established care with Dr. Ya on 1/15/2020, at which time was noted a palpable left breast mass on exam. On 2/4/2020, she underwent US guided core biopsy with microclip placements. Pathology revealed the following: \par 1. Breast, left, 2:00-3:00, 1-3 cmFN, 3.2 cm mass: Invasive ductal carcinoma, poorly-differentiated, spanning 1.2 cm in this material.\par Ductal carcinoma in situ (DCIS), solid type with high nuclear grade.\par 2. Breast, left, 2:00-3:00, 1-3 cm FN, 1.9 cm mass: Invasive ductal carcinoma, poorly-differentiated, spanning 1.7 cm in this material, morphologically similar to part 1. Ductal carcinoma in situ (DCIS), solid type with high nuclear grade.\par 3. Axilla, left, abnormal lymph node: Metastatic adenocarcinoma, spanning 5.5 mm in this material, morphologically similar to part 1.\par -ER positive (> 95% nuclear staining with strong 3+ intensity), MS negative, HER-2 equivocal, negative via FISH. \par -Ki-67 proliferation index is over 70%.\par \par PET/ CT done 2/26/2020 showed the following: \par -FDG uptake within  ill-defined left breast masses which are consistent with patient's known carcinoma.\par -Nonenlarged FDG avid axillary lymph nodes consistent with known janel metastasis. \par -Increased FDG avidity in the left proximal femur with a 4 cm lucent lesion without cortical disruption, bony metastasis cannot be excluded.  \par \par She had a biopsy of the left femur lesion on 3/6/2020; pathology was benign. \par \par She received neoadjuvant ACT chemotherapy under the management of Dr. Jaramillo from 3/26/2020 to 8/13/2020. \par \par PET/ CT done 8/28/2020 revealed the following: \par -Since 2/26/20, there is no longer abnormal activity in the left breast and left axilla.\par -There is a 0.8 cm sclerotic lesion in the L2 vertebral body, which is more dense and larger than on prior study. Although it is not hypermetabolic, it is still suspicious for metastatic disease. Recommend further evaluation with MRI and/or biopsy.\par -No change in hypermetabolic lucent lesion left femur. Recommend correlation with biopsy results.\par \par MRI Lumbar Spine done 9/14/2020 showed the following: \par -8 mm well-marginated lesion seen within right posterior aspect of L2 lumbar vertebral body demonstrating predominant hypointensity on all pulse sequences and no significant enhancement. This corresponds to a sclerotic lesion seen on CT scan of 8/28/2020. Differential remains a metastasis (osteoblastic or sclerotic) versus other etiologies such as intraosseous hemangioma, benign fibro-osseous lesion, or enostosis.\par -Shallow left paracentral disc protrusion L1-2 level and diffuse disc bulges from L2-L3 through to L5-S1 levels inclusive.\par -Central canal stenosis from L1-L2 through to L3-4 levels, greatest at L2-L3, which accounts for mild clumping of the cauda equina nerve roots.\par -Multilevel foraminal narrowing.\par -Multifocal uterine fibroids.\par \par Biopsy of the suspicious L2 lesion was done 9/30/2020; pathology was benign.\par \par Left mammo/ sono done 11/4/2020 showed the following: \par -1.5 x 0.9 x 1.2 cm irregular hypoechoic lesion at the 3:00 position, 3 cmFN, decreased in size, previously measured 1.9 x 1.2 x 1.5 cm, consistent with interval neoadjuvant therapy, known malignancy.\par -1.9 x 1.0 x 1.8 cm lobulated hypoechoic lesion 12:00 position, 3 cmFN (previously labeled 2-3 o'clock, 1 to 3 cmFN) with associated microclip, decreased in size from prior study measured 3.2 x 1.3 x 13.0 cm, consistent with interval neoadjuvant therapy, known malignancy.\par -Lymph node in the left axilla with focal mild cortical thickening to 0.5 cm, likely containing microclip from previous biopsy.\par \par On 11/6/2020, she underwent left mastectomy and left axillary lymph node dissection. Pathology revealed the following: \par 1. Left sentinel lymph node, excision: Two lymph nodes positive for metastatic carcinoma and extranodal extension (2/2).\par 2. Left axillary lymph node, excision: 0/1. \par 3. Left breast, mastectomy: Invasive ductal carcinoma, Grade 3. Measuring 7 mm, 5 mm and 2 mm in greatest dimension. Less than 1 mm from posterior margin. Ductal carcinoma in situ (DCIS), high nuclear grade, solid and cribriform types, associated with central necrosis. Lymph-vascular invasion identified.\par 4. Left axillary content, dissection: One of two lymph nodes positive for metastatic carcinoma and extranodal extension (1/2).\par 5. Left palpable axillary lymph node, excision: One of two lymph node positive for metastatic carcinoma and extranodal extension\par (1/2).\par Pathologic staging (pTNM): mypT1b pN2a\par \par She most recently followed up with Dr. Ya on 11/20/2020, at which time PHUONG drains were removed. She is scheduled to follow up with Dr. Jaramillo on 12/18.\par \par Today, she reports she feels well. SHe denies chest wall pain, edema, CP, SOB, fatigue. She does note she is prone to hiccups and is taking Zantac for this. SHe notes she tolerated chemotherapy well with the exception of fatigue. \par \par Of note, patient has Cerebral Palsy and has a live-in caregiver. Patient's father makes medical decisions for the patient. \par \par Patient lives in the [place], gets to medical appointments via [facility] [employer] or taxi. \par

## 2021-02-24 NOTE — REVIEW OF SYSTEMS
[Dysphagia: Grade 0] : Dysphagia: Grade 0 [Fatigue: Grade 0] : Fatigue: Grade 0 [Patient Intake Form Reviewed] : Patient intake form was reviewed [Muscle Weakness] : muscle weakness [Gait Disturbance] : gait disturbance [Difficulty Walking] : difficulty walking [Negative] : Heme/Lymph [Dermatitis Radiation: Grade 1 - Faint erythema or dry desquamation] : Dermatitis Radiation: Grade 1 - Faint erythema or dry desquamation [FreeTextEntry8] : +urinary incontinence. [de-identified] : s/p left mastectomy.

## 2021-02-24 NOTE — PHYSICAL EXAM
[Sclera] : the sclera and conjunctiva were normal [Outer Ear] : the ears and nose were normal in appearance [Hearing Threshold Finger Rub Not [place]] : hearing was normal [] : no respiratory distress [Respiration, Rhythm And Depth] : normal respiratory rhythm and effort [Normal] : normal spine exam without palpable tenderness, no kyphosis or scoliosis [Oriented To Time, Place, And Person] : oriented to person, place, and time [Affect] : the affect was normal [de-identified] : She has stigmata of cerebral palsy [de-identified] : s/p left mastectomy. Well healed incisions to left CW and axilla. Well healing old PHUONG drain sites. Grade 1 dermatitis noted. Right breast negative.   [de-identified] : difficulty ambulating, uses walker and is able to ambulate with assistance. RIght hemiplegia. [de-identified] : Well healed incisions to left CW and axilla. Well healing old PHUONG drain sites. Grade 1 dermatitis noted. [de-identified] : Right hemiplegia.

## 2021-02-25 ENCOUNTER — NON-APPOINTMENT (OUTPATIENT)
Age: 61
End: 2021-02-25

## 2021-02-26 LAB — BACTERIA UR CULT: NORMAL

## 2021-03-24 ENCOUNTER — APPOINTMENT (OUTPATIENT)
Dept: RADIATION ONCOLOGY | Facility: CLINIC | Age: 61
End: 2021-03-24
Payer: MEDICARE

## 2021-03-24 PROCEDURE — 99024 POSTOP FOLLOW-UP VISIT: CPT

## 2021-03-24 RX ORDER — ANASTROZOLE TABLETS 1 MG/1
1 TABLET ORAL
Refills: 0 | Status: ACTIVE | COMMUNITY

## 2021-03-24 NOTE — HISTORY OF PRESENT ILLNESS
[FreeTextEntry1] : Ms. Emily Nayak is a 61 year old female with stage IIIA [(m3)ypT1b [id number]] ER positive, FL negative HER-2 equivocal by IHC, FISH negative IDC of the LEFT breast with mets to axilla. She is s/p neoadjuvant chemotherapy and mastectomy, which was notable for 4+ nodes, ELIZABET, and high grade tumor with LVI. She completed radiation therapy to the LEFT chest wall and regional nodes for a total of 5000 cGy over 25 fractions from 1/11/21 to 2/23/21. She tolerated her radiation well and did not develop any grade 3 or higher acute toxicities as a result of treatment. \par \par 3/24/21- PTE\par Ms. Nayak returns for post treatment evaluation. She notes that she has good energy, with expected residual hyperpigmentation, still, along the chest wall, and upper back, with no skin breakdown or pain along the chest wall or arm, with full range of motion and no apparent arm swelling.  She notes she is due to see Dr. Jaramillo and Dr. Ya and will review endocrine therapy with Dr. Jaramillo.  She denies dysphagia or odynophagia, change in sensation along the arms, or dyspnea/cough.  She feels quite well.  _____________________\par INITIAL EVAL (12/4/2020)\par Ms. Emily Nayak is a 60 year old post-menopausal female referred by Dr. Ya for consideration of post- mastectomy radiation therapy for a left breast IDC with mets to axilla, ER positive, FL negative, HER-2 equivocal, FISH negative, Ki67 > 70%. She is s/p neoadjuvant AC-T chemotherapy under the management of Dr. Jaramillo and s/p left mastectomy and left axillary lymph node dissection on 11/6/2020, mypT1b pN2a. \par \par Bilateral screening mammo done 11/12/19 ([facility]) showed the following: \par -A spiculated mass within the left outer breast with fine calcifications along with abnormal appearing lymph nodes in the left axilla, these findings are highly suspicious for neoplastic process, additional diagnostic mammographic views, ultrasound examination and if these findings are visualized on ultrasound then ultrasound-guided biopsy of the lesion in the left breast and left axillary lymph nodes is recommended on the day of diagnostic workup.\par BI-RADS 5. \par \par She established care with Dr. Ya on 1/15/2020, at which time was noted a palpable left breast mass on exam. On 2/4/2020, she underwent US guided core biopsy with microclip placements. Pathology revealed the following: \par 1. Breast, left, 2:00-3:00, 1-3 cmFN, 3.2 cm mass: Invasive ductal carcinoma, poorly-differentiated, spanning 1.2 cm in this material.\par Ductal carcinoma in situ (DCIS), solid type with high nuclear grade.\par 2. Breast, left, 2:00-3:00, 1-3 cm FN, 1.9 cm mass: Invasive ductal carcinoma, poorly-differentiated, spanning 1.7 cm in this material, morphologically similar to part 1. Ductal carcinoma in situ (DCIS), solid type with high nuclear grade.\par 3. Axilla, left, abnormal lymph node: Metastatic adenocarcinoma, spanning 5.5 mm in this material, morphologically similar to part 1.\par -ER positive (> 95% nuclear staining with strong 3+ intensity), FL negative, HER-2 equivocal, negative via FISH. \par -Ki-67 proliferation index is over 70%.\par \par PET/ CT done 2/26/2020 showed the following: \par -FDG uptake within  ill-defined left breast masses which are consistent with patient's known carcinoma.\par -Nonenlarged FDG avid axillary lymph nodes consistent with known janel metastasis. \par -Increased FDG avidity in the left proximal femur with a 4 cm lucent lesion without cortical disruption, bony metastasis cannot be excluded.  \par \par She had a biopsy of the left femur lesion on 3/6/2020; pathology was benign. \par \par She received neoadjuvant ACT chemotherapy under the management of Dr. Jaramillo from 3/26/2020 to 8/13/2020. \par \par PET/ CT done 8/28/2020 revealed the following: \par -Since 2/26/20, there is no longer abnormal activity in the left breast and left axilla.\par -There is a 0.8 cm sclerotic lesion in the L2 vertebral body, which is more dense and larger than on prior study. Although it is not hypermetabolic, it is still suspicious for metastatic disease. Recommend further evaluation with MRI and/or biopsy.\par -No change in hypermetabolic lucent lesion left femur. Recommend correlation with biopsy results.\par \par MRI Lumbar Spine done 9/14/2020 showed the following: \par -8 mm well-marginated lesion seen within right posterior aspect of L2 lumbar vertebral body demonstrating predominant hypointensity on all pulse sequences and no significant enhancement. This corresponds to a sclerotic lesion seen on CT scan of 8/28/2020. Differential remains a metastasis (osteoblastic or sclerotic) versus other etiologies such as intraosseous hemangioma, benign fibro-osseous lesion, or enostosis.\par -Shallow left paracentral disc protrusion L1-2 level and diffuse disc bulges from L2-L3 through to L5-S1 levels inclusive.\par -Central canal stenosis from L1-L2 through to L3-4 levels, greatest at L2-L3, which accounts for mild clumping of the cauda equina nerve roots.\par -Multilevel foraminal narrowing.\par -Multifocal uterine fibroids.\par \par Biopsy of the suspicious L2 lesion was done 9/30/2020; pathology was benign.\par \par Left mammo/ sono done 11/4/2020 showed the following: \par -1.5 x 0.9 x 1.2 cm irregular hypoechoic lesion at the 3:00 position, 3 cmFN, decreased in size, previously measured 1.9 x 1.2 x 1.5 cm, consistent with interval neoadjuvant therapy, known malignancy.\par -1.9 x 1.0 x 1.8 cm lobulated hypoechoic lesion 12:00 position, 3 cmFN (previously labeled 2-3 o'clock, 1 to 3 cmFN) with associated microclip, decreased in size from prior study measured 3.2 x 1.3 x 13.0 cm, consistent with interval neoadjuvant therapy, known malignancy.\par -Lymph node in the left axilla with focal mild cortical thickening to 0.5 cm, likely containing microclip from previous biopsy.\par \par On 11/6/2020, she underwent left mastectomy and left axillary lymph node dissection. Pathology revealed the following: \par 1. Left sentinel lymph node, excision: Two lymph nodes positive for metastatic carcinoma and extranodal extension (2/2).\par 2. Left axillary lymph node, excision: 0/1. \par 3. Left breast, mastectomy: Invasive ductal carcinoma, Grade 3. Measuring 7 mm, 5 mm and 2 mm in greatest dimension. Less than 1 mm from posterior margin. Ductal carcinoma in situ (DCIS), high nuclear grade, solid and cribriform types, associated with central necrosis. Lymph-vascular invasion identified.\par 4. Left axillary content, dissection: One of two lymph nodes positive for metastatic carcinoma and extranodal extension (1/2).\par 5. Left palpable axillary lymph node, excision: One of two lymph node positive for metastatic carcinoma and extranodal extension\par (1/2).\par Pathologic staging (pTNM): mypT1b pN2a\par \par She most recently followed up with Dr. Ya on 11/20/2020, at which time PHUONG drains were removed. She is scheduled to follow up with Dr. Jaramillo on 12/18.\par \par Today, she reports she feels well. SHe denies chest wall pain, edema, CP, SOB, fatigue. She does note she is prone to hiccups and is taking Zantac for this. SHe notes she tolerated chemotherapy well with the exception of fatigue. \par \par Of note, patient has Cerebral Palsy and has a live-in caregiver. Patient's father makes medical decisions for the patient. \par \par Patient lives in the [place], gets to medical appointments via [employer] or taxi. \par

## 2021-03-24 NOTE — REVIEW OF SYSTEMS
[Patient Intake Form Reviewed] : Patient intake form was reviewed [Muscle Weakness] : muscle weakness [Gait Disturbance] : gait disturbance [Difficulty Walking] : difficulty walking [Negative] : Heme/Lymph [FreeTextEntry8] : +urinary incontinence. [de-identified] : s/p left mastectomy.

## 2021-03-24 NOTE — DISEASE MANAGEMENT
[Pathological] : TNM Stage: p [IIIB] : IIIB [FreeTextEntry4] : mypT1b pN2a [TTNM] : (m2)yp1b [NTNM] : yp2a [MTNM] : y0

## 2021-03-24 NOTE — PHYSICAL EXAM
[Normal] : normal spine exam without palpable tenderness, no kyphosis or scoliosis [Oriented To Time, Place, And Person] : oriented to person, place, and time [Affect] : the affect was normal [de-identified] : She has stigmata of cerebral palsy [de-identified] : s/p left mastectomy. Well healed incisions to left CW and axilla. Well healed old PHUONG drain sites.  Right breast negative.  residual hyperpigmentation along chest wall and upper back [de-identified] : difficulty ambulating, uses walker and is able to ambulate with assistance. RIght hemiplegia. [de-identified] : Well healing incisions to left CW and axilla. Well healing old PHUONG drain sites. [de-identified] : Right hemiplegia.

## 2021-04-16 ENCOUNTER — OUTPATIENT (OUTPATIENT)
Dept: OUTPATIENT SERVICES | Facility: HOSPITAL | Age: 61
LOS: 1 days | End: 2021-04-16
Payer: MEDICAID

## 2021-04-16 LAB — GLUCOSE BLDC GLUCOMTR-MCNC: 100 MG/DL — HIGH (ref 70–99)

## 2021-04-16 PROCEDURE — A9552: CPT

## 2021-04-16 PROCEDURE — 78815 PET IMAGE W/CT SKULL-THIGH: CPT | Mod: 26

## 2021-04-16 PROCEDURE — 82962 GLUCOSE BLOOD TEST: CPT

## 2021-04-16 PROCEDURE — 78815 PET IMAGE W/CT SKULL-THIGH: CPT

## 2021-04-19 ENCOUNTER — APPOINTMENT (OUTPATIENT)
Dept: BREAST CENTER | Facility: CLINIC | Age: 61
End: 2021-04-19
Payer: MEDICARE

## 2021-04-19 ENCOUNTER — RESULT REVIEW (OUTPATIENT)
Age: 61
End: 2021-04-19

## 2021-04-19 VITALS — HEART RATE: 94 BPM | WEIGHT: 175 LBS | HEIGHT: 62 IN | OXYGEN SATURATION: 98 % | BODY MASS INDEX: 32.2 KG/M2

## 2021-04-19 DIAGNOSIS — N63.0 UNSPECIFIED LUMP IN UNSPECIFIED BREAST: ICD-10-CM

## 2021-04-19 DIAGNOSIS — C50.912 MALIGNANT NEOPLASM OF UNSPECIFIED SITE OF LEFT FEMALE BREAST: ICD-10-CM

## 2021-04-19 PROCEDURE — 99213 OFFICE O/P EST LOW 20 MIN: CPT

## 2021-04-19 PROCEDURE — 99072 ADDL SUPL MATRL&STAF TM PHE: CPT

## 2021-04-19 RX ORDER — CALCIUM CARBONATE/VITAMIN D3 600 MG-10
600-400 TABLET ORAL
Qty: 60 | Refills: 0 | Status: ACTIVE | COMMUNITY
Start: 2021-02-16

## 2021-04-19 RX ORDER — CHLORPROMAZINE HYDROCHLORIDE 25 MG/1
25 TABLET, SUGAR COATED ORAL
Qty: 60 | Refills: 0 | Status: ACTIVE | COMMUNITY
Start: 2021-02-16

## 2021-04-19 NOTE — PHYSICAL EXAM
[Normocephalic] : normocephalic [Atraumatic] : atraumatic [Supple] : supple [No Supraclavicular Adenopathy] : no supraclavicular adenopathy [No Cervical Adenopathy] : no cervical adenopathy [Examined in the supine and seated position] : examined in the supine and seated position [No dominant masses] : no dominant masses in right breast  [No Nipple Retraction] : no right nipple retraction [No Nipple Discharge] : no right nipple discharge [No Axillary Lymphadenopathy] : no left axillary lymphadenopathy [No Rashes] : no rashes [No Edema] : no edema [No Ulceration] : no ulceration

## 2021-04-20 ENCOUNTER — OUTPATIENT (OUTPATIENT)
Dept: OUTPATIENT SERVICES | Facility: HOSPITAL | Age: 61
LOS: 1 days | End: 2021-04-20
Payer: MEDICARE

## 2021-04-20 ENCOUNTER — RESULT REVIEW (OUTPATIENT)
Age: 61
End: 2021-04-20

## 2021-04-20 ENCOUNTER — APPOINTMENT (OUTPATIENT)
Dept: MAMMOGRAPHY | Facility: HOSPITAL | Age: 61
End: 2021-04-20

## 2021-04-20 PROCEDURE — 77063 BREAST TOMOSYNTHESIS BI: CPT

## 2021-04-20 PROCEDURE — 76641 ULTRASOUND BREAST COMPLETE: CPT

## 2021-04-20 PROCEDURE — 77067 SCR MAMMO BI INCL CAD: CPT | Mod: 26,52

## 2021-04-20 PROCEDURE — 77067 SCR MAMMO BI INCL CAD: CPT

## 2021-04-20 PROCEDURE — 77063 BREAST TOMOSYNTHESIS BI: CPT | Mod: 26,52

## 2021-04-20 PROCEDURE — 76641 ULTRASOUND BREAST COMPLETE: CPT | Mod: 26,50

## 2021-04-21 ENCOUNTER — APPOINTMENT (OUTPATIENT)
Dept: ULTRASOUND IMAGING | Facility: HOSPITAL | Age: 61
End: 2021-04-21

## 2021-04-23 ENCOUNTER — NON-APPOINTMENT (OUTPATIENT)
Age: 61
End: 2021-04-23

## 2021-04-23 NOTE — DATA REVIEWED
[FreeTextEntry1] : --11/12/19 ([facility]) b/l screening mammo-cony showing a spiculated mass left outer breast with fine calcifications along with abnormal appearing lymph nodes in left axilla, highly suspicious for neoplastic process, additional diagnostic mammogram views, US recommended and if visualized on US, recommended for US guided biopsy of the lesion in left breast and left axillary lymph nodes recommended on day of diagnostic workup. Surgical consultation recommended. BI-RADS 5 \par  \par --2/4/2020 ([facility]) biopsy path: left 2:00-3:00n1-3 , 3.2 cm mass US bx, IDC, poorly differentiated, DCIS, high nuclear grade. Left 2:00-3:00n1-3, 1.9 cm mass, US bx, IDC poorly differentiated, spanning 1.7cm, DCIS high nuclear grade. Left axilla, US bx, metastatic adenocarcinoma. KI-67 is >70%; ER+ IN negative, HER2 equivocal, FISH negative. \par \par -- 2/26/2020 ([facility]) PET/CT: FDG uptake within left breast masses c/w known carcinoma as well as in axillary LNs c/w known carcinoma. Increased uptake in the left proximal femur, ely mets cannot be excluded. Consider tissue sampling for further evaluation. \par \par -- 3/6/2020 ([facility]) left femur bone biopsy: benign bone, no carcinoma visualized. \par \par --11/9/2020 ([facility]) left mastectomy, ALND surgical pathology: two sentinel lymph nodes positive for metastatic disease with extranodal extension, left axillary lymph node excision: one lymph node negative for tumor, left mastectomy: invasive ductal carcinoma, G3, 7mm, 5mm, and 2mm in greatest dimension, less than 1mm from posterior margin, DCIS high nuclear grade, solid and cribriform types, associated with central necrosis, lymph-vascular invasion identified, previous procedure site related change, left axillary content dissection one of two lymph nodes positive for metastatic carcinoma and extranodal extension, left palpable axillary lymph node excision: one of two lymph nodes positive for metastatic carcinoma and extranodal extension. Pathologic staging: mypT1b pN2a

## 2021-04-23 NOTE — HISTORY OF PRESENT ILLNESS
[FreeTextEntry1] : Emily is a 61 year old Black female, accompanied by her aid, Gilma, presents for post op evaluation for multifocal left breast IDC, DCIS at 5:00, 7mm, and 11:00, 5mm and 2mm , ER+ (95%), ME negative, HER2 Equivocal, FISH negative, with metastatic adenocarcinoma to the left axilla; KI67 is > 70%. S/p neoadjuvant chemotherapy. S/p left mastectomy with ALND on 11/6/2020, 4/7 lymph nodes were positive for metastatic disease, lymphovascular invasion present. S/p XRT with Dr. Jones completed 2/23/21. Patient has no breast complaints today. Denies nipple discharge, nipple/breast skin changes or dimpling, denies fever or chills.  She is taking Anastrozole managed by Dr. Brooklyn Jaramillo whom she followed up with last week. Dr. Jaramillo noted a nodule in the lateral aspect of her incision. Will obtain u/s.

## 2021-04-23 NOTE — PAST MEDICAL HISTORY
[Postmenopausal] : The patient is postmenopausal [unknown] : the patient is unsure of the date of her LMP [Total Preg ___] : G[unfilled] [Live Births ___] : P[unfilled]  [History of Hormone Replacement Treatment] : has no history of hormone replacement treatment [de-identified] : Does not remember first period [FreeTextEntry5] : None [FreeTextEntry6] : None [FreeTextEntry7] : None [FreeTextEntry8] : None

## 2021-04-23 NOTE — REASON FOR VISIT
[Post Op: _________] : a [unfilled] post op visit [FreeTextEntry1] : left breast IDC, DCIS w/ axillary mets, s/p left mastectomy and left ALND.

## 2021-04-29 ENCOUNTER — APPOINTMENT (OUTPATIENT)
Dept: ULTRASOUND IMAGING | Facility: HOSPITAL | Age: 61
End: 2021-04-29
Payer: COMMERCIAL

## 2021-04-29 ENCOUNTER — RESULT REVIEW (OUTPATIENT)
Age: 61
End: 2021-04-29

## 2021-04-29 ENCOUNTER — OUTPATIENT (OUTPATIENT)
Dept: OUTPATIENT SERVICES | Facility: HOSPITAL | Age: 61
LOS: 1 days | End: 2021-04-29
Payer: MEDICAID

## 2021-04-29 DIAGNOSIS — C50.912 MALIGNANT NEOPLASM OF UNSPECIFIED SITE OF LEFT FEMALE BREAST: ICD-10-CM

## 2021-04-29 PROCEDURE — 19084 BX BREAST ADD LESION US IMAG: CPT

## 2021-04-29 PROCEDURE — 19083 BX BREAST 1ST LESION US IMAG: CPT

## 2021-04-29 PROCEDURE — 19083 BX BREAST 1ST LESION US IMAG: CPT | Mod: LT

## 2021-04-29 PROCEDURE — 88305 TISSUE EXAM BY PATHOLOGIST: CPT

## 2021-04-29 PROCEDURE — A4648: CPT

## 2021-04-29 PROCEDURE — 88305 TISSUE EXAM BY PATHOLOGIST: CPT | Mod: 26

## 2021-04-29 PROCEDURE — 19084 BX BREAST ADD LESION US IMAG: CPT | Mod: LT

## 2021-05-03 LAB — SURGICAL PATHOLOGY STUDY: SIGNIFICANT CHANGE UP

## 2021-05-06 ENCOUNTER — NON-APPOINTMENT (OUTPATIENT)
Age: 61
End: 2021-05-06

## 2021-08-24 NOTE — HISTORY OF PRESENT ILLNESS
[FreeTextEntry1] : Emily is a 59 yo female, who presents for follow-up for left breast IDC, DCIS (ER+ [95% 3+] DE negative, HER2 Equivocal, FISH negative] and left axillary biopsy revealed metastatic adenocarcinoma; KI67 is > 70%. She is here today with her live-in caregiver and her father was called on speakerphone. \par \par The patient met with Dr. Jaramillo for neoadjuvant chemotherapy. She is s/p PET/CT IN February and is now s/p neoadjuvant chemotherapy. CT PET is pending.\par \par Discussed risks, benefits and alternatives of surgery and possibility for radiation if breast conservation is possible. Risks including infection, hematoma, seroma formation, as well as infection, and wound complications, risk of need for further procedure. Patient's father and caregiver understood risks and benefit and would like to go forward with the procedure.\par \par Will obtain left mammogram and ultrasound to assess response and decide on breast conservation.\par 
lack of motivation

## 2021-09-27 ENCOUNTER — NON-APPOINTMENT (OUTPATIENT)
Age: 61
End: 2021-09-27

## 2021-09-29 ENCOUNTER — NON-APPOINTMENT (OUTPATIENT)
Age: 61
End: 2021-09-29

## 2021-10-04 ENCOUNTER — APPOINTMENT (OUTPATIENT)
Dept: BREAST CENTER | Facility: CLINIC | Age: 61
End: 2021-10-04

## 2021-11-10 ENCOUNTER — OUTPATIENT (OUTPATIENT)
Dept: OUTPATIENT SERVICES | Facility: HOSPITAL | Age: 61
LOS: 1 days | End: 2021-11-10
Payer: MEDICAID

## 2021-11-10 LAB — GLUCOSE BLDC GLUCOMTR-MCNC: 121 MG/DL — HIGH (ref 70–99)

## 2021-11-10 PROCEDURE — 78815 PET IMAGE W/CT SKULL-THIGH: CPT

## 2021-11-10 PROCEDURE — A9552: CPT

## 2021-11-10 PROCEDURE — 78815 PET IMAGE W/CT SKULL-THIGH: CPT | Mod: 26

## 2021-11-10 PROCEDURE — 82962 GLUCOSE BLOOD TEST: CPT

## 2022-12-07 NOTE — ED ADULT TRIAGE NOTE - CHIEF COMPLAINT QUOTE
Pt brought in by home health aide stating they went to radiology and were told "go to the doctor on the first floor." Pt unsure why she was sent in, states she had an appointment but unsure why. Denies any current symptoms.  used

## 2023-03-17 ENCOUNTER — APPOINTMENT (OUTPATIENT)
Dept: ULTRASOUND IMAGING | Facility: HOSPITAL | Age: 63
End: 2023-03-17
Payer: MEDICARE

## 2023-03-17 ENCOUNTER — OUTPATIENT (OUTPATIENT)
Dept: OUTPATIENT SERVICES | Facility: HOSPITAL | Age: 63
LOS: 1 days | End: 2023-03-17
Payer: MEDICAID

## 2023-03-17 ENCOUNTER — APPOINTMENT (OUTPATIENT)
Dept: MAMMOGRAPHY | Facility: HOSPITAL | Age: 63
End: 2023-03-17
Payer: MEDICARE

## 2023-03-17 PROCEDURE — 77063 BREAST TOMOSYNTHESIS BI: CPT

## 2023-03-17 PROCEDURE — 77067 SCR MAMMO BI INCL CAD: CPT | Mod: 26,52,RT

## 2023-03-17 PROCEDURE — 77063 BREAST TOMOSYNTHESIS BI: CPT | Mod: 26,52

## 2023-03-17 PROCEDURE — 76641 ULTRASOUND BREAST COMPLETE: CPT | Mod: 26,50

## 2023-03-17 PROCEDURE — 77067 SCR MAMMO BI INCL CAD: CPT | Mod: 26,52

## 2023-03-17 PROCEDURE — 77067 SCR MAMMO BI INCL CAD: CPT

## 2023-03-17 PROCEDURE — 76641 ULTRASOUND BREAST COMPLETE: CPT

## 2023-05-24 ENCOUNTER — HOSPITAL ENCOUNTER (OUTPATIENT)
Dept: HOSPITAL 74 - JASU-ENDO | Age: 63
Discharge: HOME | End: 2023-05-24
Attending: INTERNAL MEDICINE
Payer: COMMERCIAL

## 2023-05-24 VITALS — BODY MASS INDEX: 29.8 KG/M2

## 2023-05-24 VITALS — HEART RATE: 68 BPM | DIASTOLIC BLOOD PRESSURE: 85 MMHG | RESPIRATION RATE: 12 BRPM | SYSTOLIC BLOOD PRESSURE: 137 MMHG

## 2023-05-24 VITALS — TEMPERATURE: 98 F

## 2023-05-24 DIAGNOSIS — Z83.71: ICD-10-CM

## 2023-05-24 DIAGNOSIS — D50.9: ICD-10-CM

## 2023-05-24 DIAGNOSIS — K44.9: ICD-10-CM

## 2023-05-24 DIAGNOSIS — K29.40: ICD-10-CM

## 2023-05-24 DIAGNOSIS — Z12.11: Primary | ICD-10-CM

## 2023-05-24 PROCEDURE — 0DJD8ZZ INSPECTION OF LOWER INTESTINAL TRACT, VIA NATURAL OR ARTIFICIAL OPENING ENDOSCOPIC: ICD-10-PCS | Performed by: INTERNAL MEDICINE

## 2023-05-24 PROCEDURE — 0DB98ZX EXCISION OF DUODENUM, VIA NATURAL OR ARTIFICIAL OPENING ENDOSCOPIC, DIAGNOSTIC: ICD-10-PCS | Performed by: INTERNAL MEDICINE

## 2023-05-24 PROCEDURE — 0DB78ZX EXCISION OF STOMACH, PYLORUS, VIA NATURAL OR ARTIFICIAL OPENING ENDOSCOPIC, DIAGNOSTIC: ICD-10-PCS | Performed by: INTERNAL MEDICINE

## 2023-05-24 PROCEDURE — 43239 EGD BIOPSY SINGLE/MULTIPLE: CPT

## 2024-05-16 ENCOUNTER — APPOINTMENT (OUTPATIENT)
Dept: MAMMOGRAPHY | Facility: HOSPITAL | Age: 64
End: 2024-05-16
Payer: MEDICARE

## 2024-05-16 ENCOUNTER — APPOINTMENT (OUTPATIENT)
Dept: ULTRASOUND IMAGING | Facility: HOSPITAL | Age: 64
End: 2024-05-16
Payer: MEDICARE

## 2024-06-11 ENCOUNTER — APPOINTMENT (OUTPATIENT)
Dept: ULTRASOUND IMAGING | Facility: HOSPITAL | Age: 64
End: 2024-06-11

## 2024-06-11 ENCOUNTER — APPOINTMENT (OUTPATIENT)
Dept: MAMMOGRAPHY | Facility: HOSPITAL | Age: 64
End: 2024-06-11

## 2024-06-11 ENCOUNTER — APPOINTMENT (OUTPATIENT)
Dept: RADIOLOGY | Facility: HOSPITAL | Age: 64
End: 2024-06-11

## 2024-06-11 ENCOUNTER — OUTPATIENT (OUTPATIENT)
Dept: OUTPATIENT SERVICES | Facility: HOSPITAL | Age: 64
LOS: 1 days | End: 2024-06-11

## 2024-06-11 PROCEDURE — 76641 ULTRASOUND BREAST COMPLETE: CPT | Mod: 26,RT

## 2024-06-11 PROCEDURE — 77080 DXA BONE DENSITY AXIAL: CPT | Mod: 26

## 2024-06-11 PROCEDURE — 77067 SCR MAMMO BI INCL CAD: CPT | Mod: 26,52,RT

## 2024-06-11 PROCEDURE — 77063 BREAST TOMOSYNTHESIS BI: CPT | Mod: 26,52

## 2024-06-11 PROCEDURE — 76641 ULTRASOUND BREAST COMPLETE: CPT

## 2024-06-11 PROCEDURE — 77063 BREAST TOMOSYNTHESIS BI: CPT

## 2024-06-11 PROCEDURE — 77067 SCR MAMMO BI INCL CAD: CPT

## 2024-06-11 PROCEDURE — 77080 DXA BONE DENSITY AXIAL: CPT

## 2025-06-14 ENCOUNTER — HOSPITAL ENCOUNTER (INPATIENT)
Dept: HOSPITAL 74 - JER | Age: 65
LOS: 4 days | Discharge: HOME | DRG: 597 | End: 2025-06-18
Attending: STUDENT IN AN ORGANIZED HEALTH CARE EDUCATION/TRAINING PROGRAM | Admitting: STUDENT IN AN ORGANIZED HEALTH CARE EDUCATION/TRAINING PROGRAM
Payer: COMMERCIAL

## 2025-06-14 VITALS — BODY MASS INDEX: 21.4 KG/M2

## 2025-06-14 DIAGNOSIS — J91.0: ICD-10-CM

## 2025-06-14 DIAGNOSIS — I10: ICD-10-CM

## 2025-06-14 DIAGNOSIS — R53.2: ICD-10-CM

## 2025-06-14 DIAGNOSIS — E83.52: ICD-10-CM

## 2025-06-14 DIAGNOSIS — F81.9: ICD-10-CM

## 2025-06-14 DIAGNOSIS — C50.919: Primary | ICD-10-CM

## 2025-06-14 LAB
ALBUMIN SERPL-MCNC: 3.6 G/DL (ref 3.4–5)
ALP SERPL-CCNC: 108 U/L (ref 45–117)
ALT SERPL-CCNC: 21 U/L (ref 13–61)
ANION GAP SERPL CALC-SCNC: 3 MMOL/L (ref 4–13)
APPEARANCE UR: CLEAR
APTT BLD: 43.3 SECONDS (ref 25.2–36.5)
AST SERPL-CCNC: 15 U/L (ref 15–37)
BACTERIA # UR AUTO: (no result) /UL (ref 0–1359)
BASE EXCESS BLDV CALC-SCNC: 3.1 MMOL/L (ref -2–2)
BASOPHILS # BLD AUTO: 0.04 X10^3/UL (ref 0.01–0.08)
BILIRUB SERPL-MCNC: 0.4 MG/DL (ref 0.2–1)
BILIRUB UR STRIP.AUTO-MCNC: NEGATIVE MG/DL
BUN SERPL-MCNC: 8.9 MG/DL (ref 7–18)
CALCIUM SERPL-MCNC: 13.9 MG/DL (ref 8.5–10.1)
CASTS URNS QL MICRO: (no result) /UL (ref 0–3.1)
CHLORIDE SERPL-SCNC: 108 MMOL/L (ref 98–107)
CO2 SERPL-SCNC: 30 MMOL/L (ref 21–32)
COLOR UR: YELLOW
CREAT SERPL-MCNC: 0.9 MG/DL (ref 0.55–1.3)
CRYSTALS URNS QL MICRO: (no result) /HPF
EOSINOPHIL # BLD AUTO: 0.01 X10^3/UL (ref 0.04–0.36)
EOSINOPHIL NFR BLD AUTO: 0.2 % (ref 0.7–5.8)
EPITH CASTS URNS QL MICRO: (no result) /UL (ref 0–25.1)
ERYTHROCYTE [DISTWIDTH] IN BLOOD: 14.1 % (ref 12.4–16.4)
GLUCOSE SERPL-MCNC: 109 MG/DL (ref 74–106)
HCT VFR BLD CALC: 35.9 % (ref 34.1–44.9)
HCT VFR BLDV CALC: 37 % (ref 32.4–45.2)
HGB BLD-MCNC: 11.3 G/DL (ref 11.2–15.7)
IMM GRANULOCYTES # BLD: 0.02 X10^3/UL (ref 0–0.03)
INR BLD: 1.13 (ref 0.83–1.09)
KETONES UR QL STRIP: NEGATIVE
LEUKOCYTE ESTERASE UR QL STRIP.AUTO: NEGATIVE
MAGNESIUM SERPL-MCNC: 2.1 MG/DL (ref 1.8–2.4)
MCHC RBC-ENTMCNC: 31.5 G/DL (ref 32.2–35.5)
MCV RBC: 88 FL (ref 79.4–94.8)
MONOCYTES # BLD AUTO: 0.58 X10^3/UL (ref 0.24–0.86)
MONOCYTES NFR BLD AUTO: 9 % (ref 4.7–12.5)
NITRITE UR QL STRIP: NEGATIVE
PCO2 BLDV: 43.4 MMHG (ref 38–52)
PH BLDV: 7.43 [PH] (ref 7.31–7.41)
PH UR: 8 [PH] (ref 5–8)
PHOSPHATE SERPL-MCNC: 2.5 MG/DL (ref 2.5–4.9)
PLATELET # BLD AUTO: 231 X10^3/UL (ref 182–369)
PLATELETS.RETICULATED NFR BLD AUTO: (no result) % (ref 0.9–11.2)
PLATELETS.RETICULATED NFR BLD AUTO: (no result) X10^3/UL
PMV BLD: 11.9 FL (ref 9.4–12.3)
POTASSIUM SERPLBLD-SCNC: 3.8 MMOL/L (ref 3.5–5.1)
PROT SERPL-MCNC: 7.9 G/DL (ref 6.4–8.2)
PROT UR QL STRIP: NEGATIVE
PROT UR QL STRIP: NEGATIVE
PT PNL PPP: 12.3 SEC (ref 9.7–13)
RBC # BLD AUTO: (no result) /UL (ref 0–23.9)
SAO2 % BLDV: 76.3 % (ref 70–80)
SODIUM SERPL-SCNC: 141 MMOL/L (ref 136–145)
SP GR UR: 1.02 (ref 1.01–1.03)
UROBILINOGEN UR STRIP-MCNC: 0.2 MG/DL (ref 0.2–1)
WBC # UR AUTO: (no result) /UL (ref 0–25.8)
YEAST BUDDING URNS QL: (no result)

## 2025-06-14 PROCEDURE — A9503 TC99M MEDRONATE: HCPCS

## 2025-06-14 PROCEDURE — G0378 HOSPITAL OBSERVATION PER HR: HCPCS

## 2025-06-14 RX ADMIN — SODIUM CHLORIDE SCH MLS/HR: 9 INJECTION, SOLUTION INTRAVENOUS at 17:05

## 2025-06-14 RX ADMIN — CALCITONIN SALMON SCH UNIT: 200 INJECTION, SOLUTION INTRAMUSCULAR; SUBCUTANEOUS at 21:58

## 2025-06-14 RX ADMIN — HEPARIN SODIUM SCH UNIT: 5000 INJECTION, SOLUTION INTRAVENOUS; SUBCUTANEOUS at 21:58

## 2025-06-14 RX ADMIN — SODIUM CHLORIDE ONE ML: 9 INJECTION, SOLUTION INTRAVENOUS at 15:08

## 2025-06-15 LAB
ALBUMIN SERPL-MCNC: 3.3 G/DL (ref 3.4–5)
ALP SERPL-CCNC: 104 U/L (ref 45–117)
ALT SERPL-CCNC: 19 U/L (ref 13–61)
ANION GAP SERPL CALC-SCNC: 6 MMOL/L (ref 4–13)
AST SERPL-CCNC: 15 U/L (ref 15–37)
BASOPHILS # BLD AUTO: 0.02 X10^3/UL (ref 0.01–0.08)
BILIRUB SERPL-MCNC: 0.4 MG/DL (ref 0.2–1)
BUN SERPL-MCNC: 6.6 MG/DL (ref 7–18)
CALCIUM SERPL-MCNC: 11.5 MG/DL (ref 8.5–10.1)
CHLORIDE SERPL-SCNC: 113 MMOL/L (ref 98–107)
CO2 SERPL-SCNC: 25 MMOL/L (ref 21–32)
CREAT SERPL-MCNC: 0.7 MG/DL (ref 0.55–1.3)
EOSINOPHIL # BLD AUTO: 0.01 X10^3/UL (ref 0.04–0.36)
EOSINOPHIL NFR BLD AUTO: 0.2 % (ref 0.7–5.8)
ERYTHROCYTE [DISTWIDTH] IN BLOOD: 14.1 % (ref 12.4–16.4)
GLUCOSE SERPL-MCNC: 132 MG/DL (ref 74–106)
HCT VFR BLD CALC: 35.7 % (ref 34.1–44.9)
HGB BLD-MCNC: 11 G/DL (ref 11.2–15.7)
IMM GRANULOCYTES # BLD: 0.01 X10^3/UL (ref 0–0.03)
MAGNESIUM SERPL-MCNC: 1.8 MG/DL (ref 1.8–2.4)
MCHC RBC-ENTMCNC: 30.8 G/DL (ref 32.2–35.5)
MCV RBC: 88.6 FL (ref 79.4–94.8)
MONOCYTES # BLD AUTO: 0.51 X10^3/UL (ref 0.24–0.86)
MONOCYTES NFR BLD AUTO: 7.9 % (ref 4.7–12.5)
PHOSPHATE SERPL-MCNC: 2.2 MG/DL (ref 2.5–4.9)
PLATELET # BLD AUTO: 209 X10^3/UL (ref 182–369)
PLATELETS.RETICULATED NFR BLD AUTO: (no result) % (ref 0.9–11.2)
PLATELETS.RETICULATED NFR BLD AUTO: (no result) X10^3/UL
PMV BLD: 12.8 FL (ref 9.4–12.3)
POTASSIUM SERPLBLD-SCNC: 4 MMOL/L (ref 3.5–5.1)
PROT SERPL-MCNC: 7.5 G/DL (ref 6.4–8.2)
SODIUM SERPL-SCNC: 143 MMOL/L (ref 136–145)

## 2025-06-15 RX ADMIN — POTASSIUM & SODIUM PHOSPHATES POWDER PACK 280-160-250 MG ONE PACKET: 280-160-250 PACK at 09:20

## 2025-06-15 RX ADMIN — BENZONATATE PRN MG: 200 CAPSULE ORAL at 17:44

## 2025-06-15 RX ADMIN — SODIUM CHLORIDE SCH MLS/HR: 9 INJECTION, SOLUTION INTRAVENOUS at 15:00

## 2025-06-15 RX ADMIN — ANASTROZOLE SCH MG: 1 TABLET, COATED ORAL at 09:16

## 2025-06-15 RX ADMIN — ZOLEDRONIC ACID ONE MLS/HR: 0.8 INJECTION, SOLUTION, CONCENTRATE INTRAVENOUS at 15:57

## 2025-06-16 LAB
ALBUMIN SERPL-MCNC: 3.4 G/DL (ref 3.4–5)
ALP SERPL-CCNC: 106 U/L (ref 45–117)
ALT SERPL-CCNC: 22 U/L (ref 13–61)
ANION GAP SERPL CALC-SCNC: 6 MMOL/L (ref 4–13)
AST SERPL-CCNC: 16 U/L (ref 15–37)
BASOPHILS # BLD AUTO: (no result) X10^3/UL (ref 0.01–0.08)
BILIRUB SERPL-MCNC: 0.6 MG/DL (ref 0.2–1)
BUN SERPL-MCNC: 6 MG/DL (ref 7–18)
CALCIUM SERPL-MCNC: 11.6 MG/DL (ref 8.5–10.1)
CHLORIDE SERPL-SCNC: 109 MMOL/L (ref 98–107)
CO2 SERPL-SCNC: 26 MMOL/L (ref 21–32)
CREAT SERPL-MCNC: 0.7 MG/DL (ref 0.55–1.3)
EOSINOPHIL # BLD AUTO: (no result) X10^3/UL (ref 0.04–0.36)
EOSINOPHIL NFR BLD AUTO: (no result) % (ref 0.7–5.8)
ERYTHROCYTE [DISTWIDTH] IN BLOOD: 13.9 % (ref 12.4–16.4)
GLUCOSE SERPL-MCNC: 131 MG/DL (ref 74–106)
HCT VFR BLD CALC: 37.8 % (ref 34.1–44.9)
HGB BLD-MCNC: 12.1 G/DL (ref 11.2–15.7)
IMM GRANULOCYTES # BLD: (no result) X10^3/UL (ref 0–0.03)
MCHC RBC-ENTMCNC: 32 G/DL (ref 32.2–35.5)
MCV RBC: 87.1 FL (ref 79.4–94.8)
MONOCYTES # BLD AUTO: (no result) X10^3/UL (ref 0.24–0.86)
MONOCYTES NFR BLD AUTO: (no result) % (ref 4.7–12.5)
PLATELET # BLD AUTO: 200 X10^3/UL (ref 182–369)
PLATELETS.RETICULATED NFR BLD AUTO: (no result) % (ref 0.9–11.2)
PLATELETS.RETICULATED NFR BLD AUTO: (no result) X10^3/UL
PMV BLD: 12.8 FL (ref 9.4–12.3)
POTASSIUM SERPLBLD-SCNC: 3.9 MMOL/L (ref 3.5–5.1)
PROT SERPL-MCNC: 8 G/DL (ref 6.4–8.2)
SODIUM SERPL-SCNC: 141 MMOL/L (ref 136–145)

## 2025-06-16 RX ADMIN — ACETAMINOPHEN ONE MG: 325 TABLET ORAL at 22:37

## 2025-06-16 RX ADMIN — GUAIFENESIN ONE ML: 100 SOLUTION ORAL at 22:36

## 2025-06-17 VITALS — RESPIRATION RATE: 18 BRPM

## 2025-06-17 LAB
ALBUMIN SERPL-MCNC: 3.2 G/DL (ref 3.4–5)
ALP SERPL-CCNC: 98 U/L (ref 45–117)
ALT SERPL-CCNC: 27 U/L (ref 13–61)
ANION GAP SERPL CALC-SCNC: 5 MMOL/L (ref 4–13)
ANION GAP SERPL CALC-SCNC: 6 MMOL/L (ref 4–13)
AST SERPL-CCNC: 24 U/L (ref 15–37)
BILIRUB SERPL-MCNC: 0.5 MG/DL (ref 0.2–1)
BUN SERPL-MCNC: 7.3 MG/DL (ref 7–18)
BUN SERPL-MCNC: 8.7 MG/DL (ref 7–18)
CALCIUM SERPL-MCNC: 10.7 MG/DL (ref 8.5–10.1)
CALCIUM SERPL-MCNC: 9.5 MG/DL (ref 8.5–10.1)
CHLORIDE SERPL-SCNC: 108 MMOL/L (ref 98–107)
CHLORIDE SERPL-SCNC: 111 MMOL/L (ref 98–107)
CO2 SERPL-SCNC: 24 MMOL/L (ref 21–32)
CO2 SERPL-SCNC: 24 MMOL/L (ref 21–32)
CREAT SERPL-MCNC: 0.6 MG/DL (ref 0.55–1.3)
CREAT SERPL-MCNC: 0.7 MG/DL (ref 0.55–1.3)
GLUCOSE SERPL-MCNC: 116 MG/DL (ref 74–106)
GLUCOSE SERPL-MCNC: 124 MG/DL (ref 74–106)
MAGNESIUM SERPL-MCNC: 1.6 MG/DL (ref 1.8–2.4)
PHOSPHATE SERPL-MCNC: 1.6 MG/DL (ref 2.5–4.9)
POTASSIUM SERPLBLD-SCNC: 3.8 MMOL/L (ref 3.5–5.1)
POTASSIUM SERPLBLD-SCNC: 3.8 MMOL/L (ref 3.5–5.1)
PROT SERPL-MCNC: 7.6 G/DL (ref 6.4–8.2)
SODIUM SERPL-SCNC: 138 MMOL/L (ref 136–145)
SODIUM SERPL-SCNC: 140 MMOL/L (ref 136–145)

## 2025-06-17 PROCEDURE — 0W9930Z DRAINAGE OF RIGHT PLEURAL CAVITY WITH DRAINAGE DEVICE, PERCUTANEOUS APPROACH: ICD-10-PCS

## 2025-06-17 RX ADMIN — MAGNESIUM OXIDE TAB 400 MG (241.3 MG ELEMENTAL MG) ONE MG: 400 (241.3 MG) TAB at 16:39

## 2025-06-17 RX ADMIN — SODIUM CHLORIDE SCH MLS/HR: 9 INJECTION, SOLUTION INTRAVENOUS at 15:26

## 2025-06-17 RX ADMIN — DIPHENHYDRAMINE HYDROCHLORIDE ONE MG: 50 INJECTION INTRAMUSCULAR; INTRAVENOUS at 10:25

## 2025-06-17 RX ADMIN — POTASSIUM & SODIUM PHOSPHATES POWDER PACK 280-160-250 MG SCH PACKET: 280-160-250 PACK at 16:39

## 2025-06-18 VITALS — HEART RATE: 88 BPM | TEMPERATURE: 98.6 F | SYSTOLIC BLOOD PRESSURE: 115 MMHG | DIASTOLIC BLOOD PRESSURE: 62 MMHG

## 2025-06-18 LAB
ALBUMIN SERPL-MCNC: 2.8 G/DL (ref 3.4–5)
ALP SERPL-CCNC: 83 U/L (ref 45–117)
ALT SERPL-CCNC: 31 U/L (ref 13–61)
ANION GAP SERPL CALC-SCNC: 8 MMOL/L (ref 4–13)
AST SERPL-CCNC: 25 U/L (ref 15–37)
BILIRUB SERPL-MCNC: 0.4 MG/DL (ref 0.2–1)
BUN SERPL-MCNC: 11.6 MG/DL (ref 7–18)
CALCIUM SERPL-MCNC: 9.2 MG/DL (ref 8.5–10.1)
CHLORIDE SERPL-SCNC: 109 MMOL/L (ref 98–107)
CO2 SERPL-SCNC: 23 MMOL/L (ref 21–32)
CREAT SERPL-MCNC: 0.6 MG/DL (ref 0.55–1.3)
GLUCOSE SERPL-MCNC: 103 MG/DL (ref 74–106)
POTASSIUM SERPLBLD-SCNC: 3.7 MMOL/L (ref 3.5–5.1)
PROT SERPL-MCNC: 6.9 G/DL (ref 6.4–8.2)
SODIUM SERPL-SCNC: 139 MMOL/L (ref 136–145)

## 2025-07-11 ENCOUNTER — HOSPITAL ENCOUNTER (INPATIENT)
Dept: HOSPITAL 74 - JRADIR | Age: 65
LOS: 5 days | Discharge: HOME | DRG: 598 | End: 2025-07-16
Attending: FAMILY MEDICINE | Admitting: FAMILY MEDICINE
Payer: COMMERCIAL

## 2025-07-11 VITALS — BODY MASS INDEX: 20.4 KG/M2

## 2025-07-11 DIAGNOSIS — E83.52: ICD-10-CM

## 2025-07-11 DIAGNOSIS — J91.0: ICD-10-CM

## 2025-07-11 DIAGNOSIS — G89.3: ICD-10-CM

## 2025-07-11 DIAGNOSIS — I10: ICD-10-CM

## 2025-07-11 DIAGNOSIS — C50.911: Primary | ICD-10-CM

## 2025-07-11 DIAGNOSIS — R50.9: ICD-10-CM

## 2025-07-11 PROCEDURE — 0W9G3ZZ DRAINAGE OF PERITONEAL CAVITY, PERCUTANEOUS APPROACH: ICD-10-PCS

## 2025-07-11 PROCEDURE — 0BPQX0Z REMOVAL OF DRAINAGE DEVICE FROM PLEURA, EXTERNAL APPROACH: ICD-10-PCS

## 2025-07-11 RX ADMIN — BACLOFEN SCH MG: 10 TABLET ORAL at 21:10

## 2025-07-11 RX ADMIN — FAMOTIDINE SCH MG: 20 TABLET ORAL at 19:38

## 2025-07-12 LAB
ALBUMIN SERPL-MCNC: 2.2 G/DL (ref 3.4–5)
ALP SERPL-CCNC: 94 U/L (ref 45–117)
ALT SERPL-CCNC: 28 U/L (ref 13–61)
ANION GAP SERPL CALC-SCNC: 8 MMOL/L (ref 4–13)
AST SERPL-CCNC: 19 U/L (ref 15–37)
BILIRUB SERPL-MCNC: 0.4 MG/DL (ref 0.2–1)
BUN SERPL-MCNC: 18.6 MG/DL (ref 7–18)
CALCIUM SERPL-MCNC: 8.1 MG/DL (ref 8.5–10.1)
CHLORIDE SERPL-SCNC: 107 MMOL/L (ref 98–107)
CO2 SERPL-SCNC: 29 MMOL/L (ref 21–32)
CREAT SERPL-MCNC: 0.9 MG/DL (ref 0.55–1.3)
ERYTHROCYTE [DISTWIDTH] IN BLOOD: 14.3 % (ref 12.4–16.4)
GLUCOSE SERPL-MCNC: 145 MG/DL (ref 74–106)
HCT VFR BLD CALC: 36.5 % (ref 34.1–44.9)
HGB BLD-MCNC: 11.3 G/DL (ref 11.2–15.7)
MCHC RBC-ENTMCNC: 31 G/DL (ref 32.2–35.5)
MCV RBC: 86.5 FL (ref 79.4–94.8)
PLATELET # BLD AUTO: 298 X10^3/UL (ref 182–369)
PLATELETS.RETICULATED NFR BLD AUTO: (no result) % (ref 0.9–11.2)
PLATELETS.RETICULATED NFR BLD AUTO: (no result) X10^3/UL
PMV BLD: 11.7 FL (ref 9.4–12.3)
POTASSIUM SERPLBLD-SCNC: 3.5 MMOL/L (ref 3.5–5.1)
PROT SERPL-MCNC: 6.2 G/DL (ref 6.4–8.2)
SODIUM SERPL-SCNC: 143 MMOL/L (ref 136–145)

## 2025-07-12 RX ADMIN — ACETAMINOPHEN PRN MG: 325 TABLET ORAL at 09:33

## 2025-07-12 RX ADMIN — ANASTROZOLE SCH MG: 1 TABLET, COATED ORAL at 09:31

## 2025-07-15 VITALS — RESPIRATION RATE: 18 BRPM

## 2025-07-15 LAB
ALBUMIN SERPL-MCNC: 2.2 G/DL (ref 3.4–5)
ALP SERPL-CCNC: 87 U/L (ref 45–117)
ALT SERPL-CCNC: 21 U/L (ref 13–61)
ANION GAP SERPL CALC-SCNC: 4 MMOL/L (ref 4–13)
APPEARANCE UR: CLEAR
AST SERPL-CCNC: 14 U/L (ref 15–37)
BACTERIA # UR AUTO: (no result) /UL (ref 0–1359)
BASOPHILS # BLD AUTO: 0.03 X10^3/UL (ref 0.01–0.08)
BILIRUB SERPL-MCNC: 0.4 MG/DL (ref 0.2–1)
BILIRUB UR STRIP.AUTO-MCNC: NEGATIVE MG/DL
BUN SERPL-MCNC: 8.3 MG/DL (ref 7–18)
CALCIUM SERPL-MCNC: 8.4 MG/DL (ref 8.5–10.1)
CASTS URNS QL MICRO: (no result) /UL (ref 0–3.1)
CHLORIDE SERPL-SCNC: 108 MMOL/L (ref 98–107)
CO2 SERPL-SCNC: 30 MMOL/L (ref 21–32)
COLOR UR: YELLOW
CREAT SERPL-MCNC: 0.7 MG/DL (ref 0.55–1.3)
CRYSTALS URNS QL MICRO: (no result) /HPF
EOSINOPHIL # BLD AUTO: 0.07 X10^3/UL (ref 0.04–0.36)
EOSINOPHIL NFR BLD AUTO: 1 % (ref 0.7–5.8)
EPITH CASTS URNS QL MICRO: (no result) /UL (ref 0–25.1)
ERYTHROCYTE [DISTWIDTH] IN BLOOD: 14.3 % (ref 12.4–16.4)
GLUCOSE SERPL-MCNC: 137 MG/DL (ref 74–106)
HCT VFR BLD CALC: 37.1 % (ref 34.1–44.9)
HGB BLD-MCNC: 11.4 G/DL (ref 11.2–15.7)
IMM GRANULOCYTES # BLD: 0.02 X10^3/UL (ref 0–0.03)
KETONES UR QL STRIP: (no result)
LEUKOCYTE ESTERASE UR QL STRIP.AUTO: (no result)
MCHC RBC-ENTMCNC: 30.7 G/DL (ref 32.2–35.5)
MCV RBC: 87.7 FL (ref 79.4–94.8)
MONOCYTES # BLD AUTO: 0.67 X10^3/UL (ref 0.24–0.86)
MONOCYTES NFR BLD AUTO: 9.8 % (ref 4.7–12.5)
NITRITE UR QL STRIP: NEGATIVE
PH UR: 6.5 [PH] (ref 5–8)
PLATELET # BLD AUTO: 386 X10^3/UL (ref 182–369)
PLATELETS.RETICULATED NFR BLD AUTO: (no result) % (ref 0.9–11.2)
PLATELETS.RETICULATED NFR BLD AUTO: (no result) X10^3/UL
PMV BLD: 10.9 FL (ref 9.4–12.3)
POTASSIUM SERPLBLD-SCNC: 4.1 MMOL/L (ref 3.5–5.1)
PROT SERPL-MCNC: 6.8 G/DL (ref 6.4–8.2)
PROT UR QL STRIP: (no result)
PROT UR QL STRIP: NEGATIVE
RBC # BLD AUTO: (no result) /UL (ref 0–23.9)
SODIUM SERPL-SCNC: 141 MMOL/L (ref 136–145)
SP GR UR: 1.02 (ref 1.01–1.03)
UROBILINOGEN UR STRIP-MCNC: 1 MG/DL (ref 0.2–1)
YEAST BUDDING URNS QL: (no result)

## 2025-07-15 RX ADMIN — BACLOFEN SCH MG: 10 TABLET ORAL at 21:39

## 2025-07-16 VITALS — SYSTOLIC BLOOD PRESSURE: 122 MMHG | DIASTOLIC BLOOD PRESSURE: 67 MMHG | HEART RATE: 89 BPM

## 2025-07-16 VITALS — TEMPERATURE: 97.2 F

## 2025-07-16 LAB
BASOPHILS # BLD AUTO: (no result) X10^3/UL (ref 0.01–0.08)
EOSINOPHIL # BLD AUTO: (no result) X10^3/UL (ref 0.04–0.36)
EOSINOPHIL NFR BLD AUTO: (no result) % (ref 0.7–5.8)
IMM GRANULOCYTES # BLD: (no result) X10^3/UL (ref 0–0.03)
MONOCYTES # BLD AUTO: (no result) X10^3/UL (ref 0.24–0.86)
MONOCYTES NFR BLD AUTO: (no result) % (ref 4.7–12.5)
PLATELETS.RETICULATED NFR BLD AUTO: (no result) % (ref 0.9–11.2)
PLATELETS.RETICULATED NFR BLD AUTO: (no result) X10^3/UL
PMV BLD: (no result) FL (ref 9.4–12.3)